# Patient Record
Sex: MALE | Race: WHITE | Employment: FULL TIME | URBAN - METROPOLITAN AREA
[De-identification: names, ages, dates, MRNs, and addresses within clinical notes are randomized per-mention and may not be internally consistent; named-entity substitution may affect disease eponyms.]

---

## 2017-01-05 RX ORDER — SERTRALINE HYDROCHLORIDE 100 MG/1
TABLET, FILM COATED ORAL
Qty: 30 TABLET | Refills: 0 | Status: SHIPPED | OUTPATIENT
Start: 2017-01-05 | End: 2017-02-05 | Stop reason: SDUPTHER

## 2017-01-26 ENCOUNTER — OFFICE VISIT (OUTPATIENT)
Dept: FAMILY MEDICINE CLINIC | Age: 42
End: 2017-01-26

## 2017-01-26 ENCOUNTER — TELEPHONE (OUTPATIENT)
Dept: FAMILY MEDICINE CLINIC | Age: 42
End: 2017-01-26

## 2017-01-26 VITALS
HEIGHT: 70 IN | HEART RATE: 116 BPM | DIASTOLIC BLOOD PRESSURE: 76 MMHG | BODY MASS INDEX: 35.65 KG/M2 | SYSTOLIC BLOOD PRESSURE: 132 MMHG | RESPIRATION RATE: 20 BRPM | WEIGHT: 249 LBS

## 2017-01-26 DIAGNOSIS — R53.81 MALAISE AND FATIGUE: ICD-10-CM

## 2017-01-26 DIAGNOSIS — R63.1 ALWAYS THIRSTY: ICD-10-CM

## 2017-01-26 DIAGNOSIS — R53.83 MALAISE AND FATIGUE: ICD-10-CM

## 2017-01-26 DIAGNOSIS — E66.01 MORBID OBESITY DUE TO EXCESS CALORIES (HCC): ICD-10-CM

## 2017-01-26 DIAGNOSIS — R35.0 URINE FREQUENCY: Primary | ICD-10-CM

## 2017-01-26 DIAGNOSIS — E11.9 TYPE 2 DIABETES MELLITUS WITHOUT COMPLICATION, WITHOUT LONG-TERM CURRENT USE OF INSULIN (HCC): ICD-10-CM

## 2017-01-26 DIAGNOSIS — Z13.6 SCREENING FOR CARDIOVASCULAR CONDITION: ICD-10-CM

## 2017-01-26 LAB
ALBUMIN SERPL-MCNC: 4.8 G/DL (ref 3.9–4.9)
ALP BLD-CCNC: 96 U/L (ref 35–104)
ALT SERPL-CCNC: 107 U/L (ref 0–41)
ANION GAP SERPL CALCULATED.3IONS-SCNC: 18 MEQ/L (ref 7–13)
ANISOCYTOSIS: 0
AST SERPL-CCNC: 94 U/L (ref 0–40)
BASOPHILS ABSOLUTE: 0 K/UL (ref 0–0.2)
BASOPHILS RELATIVE PERCENT: 0.5 %
BILIRUB SERPL-MCNC: 0.8 MG/DL (ref 0–1.2)
BILIRUBIN, POC: NORMAL
BLOOD URINE, POC: NORMAL
BUN BLDV-MCNC: 14 MG/DL (ref 6–20)
CALCIUM SERPL-MCNC: 9.3 MG/DL (ref 8.6–10.2)
CHLORIDE BLD-SCNC: 94 MEQ/L (ref 98–107)
CHOLESTEROL, TOTAL: 345 MG/DL (ref 0–199)
CLARITY, POC: CLEAR
CO2: 22 MEQ/L (ref 22–29)
COLOR, POC: NORMAL
CREAT SERPL-MCNC: 0.76 MG/DL (ref 0.7–1.2)
EOSINOPHILS ABSOLUTE: 0.1 K/UL (ref 0–0.7)
EOSINOPHILS RELATIVE PERCENT: 1.3 %
GFR AFRICAN AMERICAN: >60
GFR NON-AFRICAN AMERICAN: >60
GLOBULIN: 2.7 G/DL (ref 2.3–3.5)
GLUCOSE BLD-MCNC: 333 MG/DL
GLUCOSE BLD-MCNC: 346 MG/DL (ref 74–109)
GLUCOSE URINE, POC: 1000
HBA1C MFR BLD: 10.2 % (ref 4.8–5.9)
HCT VFR BLD CALC: 46.8 % (ref 42–52)
HDLC SERPL-MCNC: 33 MG/DL (ref 40–59)
HEMOGLOBIN: 16.6 G/DL (ref 14–18)
HYPOCHROMIA: 0
KETONES, POC: 40
LDL CHOLESTEROL CALCULATED: ABNORMAL MG/DL (ref 0–129)
LEUKOCYTE EST, POC: NORMAL
LYMPHOCYTES ABSOLUTE: 1.5 K/UL (ref 1–4.8)
LYMPHOCYTES RELATIVE PERCENT: 18.3 %
MACROCYTES: 0
MCH RBC QN AUTO: 31.7 PG (ref 27–31.3)
MCHC RBC AUTO-ENTMCNC: 35.5 % (ref 33–37)
MCV RBC AUTO: 89.2 FL (ref 80–100)
MICROCYTES: 0
MONOCYTES ABSOLUTE: 0.4 K/UL (ref 0.2–0.8)
MONOCYTES RELATIVE PERCENT: 4.8 %
NEUTROPHILS ABSOLUTE: 6.3 K/UL (ref 1.4–6.5)
NEUTROPHILS RELATIVE PERCENT: 75.1 %
NITRITE, POC: NORMAL
PDW BLD-RTO: 13.3 % (ref 11.5–14.5)
PH, POC: 5.5
PLATELET # BLD: 165 K/UL (ref 130–400)
POIKILOCYTES: 0
POLYCHROMASIA: 0
POTASSIUM SERPL-SCNC: 3.8 MEQ/L (ref 3.5–5.1)
PROTEIN, POC: 15
RBC # BLD: 5.24 M/UL (ref 4.7–6.1)
SODIUM BLD-SCNC: 134 MEQ/L (ref 132–144)
SPECIFIC GRAVITY, POC: 1.02
T4 FREE: 0.99 NG/DL (ref 0.93–1.7)
TOTAL PROTEIN: 7.5 G/DL (ref 6.4–8.1)
TRIGL SERPL-MCNC: 2278 MG/DL (ref 0–200)
UROBILINOGEN, POC: 0.2
WBC # BLD: 8.4 K/UL (ref 4.8–10.8)

## 2017-01-26 PROCEDURE — 1036F TOBACCO NON-USER: CPT | Performed by: FAMILY MEDICINE

## 2017-01-26 PROCEDURE — 82962 GLUCOSE BLOOD TEST: CPT | Performed by: FAMILY MEDICINE

## 2017-01-26 PROCEDURE — G8427 DOCREV CUR MEDS BY ELIG CLIN: HCPCS | Performed by: FAMILY MEDICINE

## 2017-01-26 PROCEDURE — 3046F HEMOGLOBIN A1C LEVEL >9.0%: CPT | Performed by: FAMILY MEDICINE

## 2017-01-26 PROCEDURE — 81003 URINALYSIS AUTO W/O SCOPE: CPT | Performed by: FAMILY MEDICINE

## 2017-01-26 PROCEDURE — G8419 CALC BMI OUT NRM PARAM NOF/U: HCPCS | Performed by: FAMILY MEDICINE

## 2017-01-26 PROCEDURE — 99214 OFFICE O/P EST MOD 30 MIN: CPT | Performed by: FAMILY MEDICINE

## 2017-01-26 PROCEDURE — G8484 FLU IMMUNIZE NO ADMIN: HCPCS | Performed by: FAMILY MEDICINE

## 2017-01-26 RX ORDER — LANCETS 30 GAUGE
EACH MISCELLANEOUS
Qty: 100 EACH | Refills: 3 | Status: SHIPPED | OUTPATIENT
Start: 2017-01-26

## 2017-01-26 RX ORDER — LORAZEPAM 0.5 MG/1
TABLET ORAL
Qty: 30 TABLET | Refills: 0 | Status: ON HOLD | OUTPATIENT
Start: 2017-01-26 | End: 2021-02-26

## 2017-01-26 ASSESSMENT — ENCOUNTER SYMPTOMS
EYE ITCHING: 0
VOMITING: 0
COUGH: 0
SHORTNESS OF BREATH: 0
CONSTIPATION: 0
ABDOMINAL PAIN: 0
NAUSEA: 0
EYE DISCHARGE: 0
DIARRHEA: 0
SORE THROAT: 0
SINUS PRESSURE: 0

## 2017-01-27 ENCOUNTER — TELEPHONE (OUTPATIENT)
Dept: FAMILY MEDICINE CLINIC | Age: 42
End: 2017-01-27

## 2017-01-27 RX ORDER — GLIMEPIRIDE 2 MG/1
2 TABLET ORAL
Qty: 30 TABLET | Refills: 3 | Status: SHIPPED | OUTPATIENT
Start: 2017-01-27 | End: 2017-01-30 | Stop reason: SDUPTHER

## 2017-01-28 LAB — TESTOSTERONE TOTAL-MALE: 299 NG/DL (ref 300–890)

## 2017-01-30 ENCOUNTER — OFFICE VISIT (OUTPATIENT)
Dept: FAMILY MEDICINE CLINIC | Age: 42
End: 2017-01-30

## 2017-01-30 ENCOUNTER — CARE COORDINATOR VISIT (OUTPATIENT)
Dept: CARE COORDINATION | Age: 42
End: 2017-01-30

## 2017-01-30 VITALS
SYSTOLIC BLOOD PRESSURE: 124 MMHG | HEART RATE: 84 BPM | WEIGHT: 244 LBS | BODY MASS INDEX: 34.93 KG/M2 | HEIGHT: 70 IN | DIASTOLIC BLOOD PRESSURE: 82 MMHG | RESPIRATION RATE: 16 BRPM | TEMPERATURE: 97.4 F

## 2017-01-30 DIAGNOSIS — E11.9 TYPE 2 DIABETES MELLITUS WITHOUT COMPLICATION, WITHOUT LONG-TERM CURRENT USE OF INSULIN (HCC): ICD-10-CM

## 2017-01-30 PROCEDURE — 99214 OFFICE O/P EST MOD 30 MIN: CPT | Performed by: FAMILY MEDICINE

## 2017-01-30 PROCEDURE — 1036F TOBACCO NON-USER: CPT | Performed by: FAMILY MEDICINE

## 2017-01-30 PROCEDURE — G8484 FLU IMMUNIZE NO ADMIN: HCPCS | Performed by: FAMILY MEDICINE

## 2017-01-30 PROCEDURE — G8419 CALC BMI OUT NRM PARAM NOF/U: HCPCS | Performed by: FAMILY MEDICINE

## 2017-01-30 PROCEDURE — G8427 DOCREV CUR MEDS BY ELIG CLIN: HCPCS | Performed by: FAMILY MEDICINE

## 2017-01-30 PROCEDURE — 3046F HEMOGLOBIN A1C LEVEL >9.0%: CPT | Performed by: FAMILY MEDICINE

## 2017-01-30 RX ORDER — GLIMEPIRIDE 2 MG/1
2 TABLET ORAL
Qty: 60 TABLET | Refills: 5 | Status: SHIPPED | OUTPATIENT
Start: 2017-01-30 | End: 2018-02-02 | Stop reason: SDUPTHER

## 2017-01-30 RX ORDER — OMEGA-3-ACID ETHYL ESTERS 1 G/1
2 CAPSULE, LIQUID FILLED ORAL 2 TIMES DAILY
Qty: 120 CAPSULE | Refills: 3 | Status: SHIPPED | OUTPATIENT
Start: 2017-01-30 | End: 2017-05-01 | Stop reason: SDUPTHER

## 2017-01-30 ASSESSMENT — ENCOUNTER SYMPTOMS
SORE THROAT: 0
SINUS PRESSURE: 0
EYE DISCHARGE: 0
CONSTIPATION: 0
DIARRHEA: 0
EYE ITCHING: 0
ABDOMINAL PAIN: 0

## 2017-02-06 RX ORDER — SERTRALINE HYDROCHLORIDE 100 MG/1
TABLET, FILM COATED ORAL
Qty: 30 TABLET | Refills: 5 | Status: SHIPPED | OUTPATIENT
Start: 2017-02-06 | End: 2017-07-10 | Stop reason: SDUPTHER

## 2017-02-08 ENCOUNTER — CARE COORDINATION (OUTPATIENT)
Dept: CARE COORDINATION | Age: 42
End: 2017-02-08

## 2017-03-09 ENCOUNTER — OFFICE VISIT (OUTPATIENT)
Dept: FAMILY MEDICINE CLINIC | Age: 42
End: 2017-03-09

## 2017-03-09 VITALS
WEIGHT: 254 LBS | DIASTOLIC BLOOD PRESSURE: 96 MMHG | SYSTOLIC BLOOD PRESSURE: 138 MMHG | RESPIRATION RATE: 16 BRPM | HEART RATE: 88 BPM | HEIGHT: 70 IN | BODY MASS INDEX: 36.36 KG/M2 | TEMPERATURE: 97.8 F

## 2017-03-09 DIAGNOSIS — Z23 NEED FOR PNEUMOCOCCAL VACCINATION: ICD-10-CM

## 2017-03-09 DIAGNOSIS — E66.01 MORBID OBESITY DUE TO EXCESS CALORIES (HCC): ICD-10-CM

## 2017-03-09 DIAGNOSIS — K21.9 GASTROESOPHAGEAL REFLUX DISEASE WITHOUT ESOPHAGITIS: ICD-10-CM

## 2017-03-09 DIAGNOSIS — E11.9 TYPE 2 DIABETES MELLITUS WITHOUT COMPLICATION, WITHOUT LONG-TERM CURRENT USE OF INSULIN (HCC): Primary | ICD-10-CM

## 2017-03-09 PROCEDURE — 99213 OFFICE O/P EST LOW 20 MIN: CPT | Performed by: FAMILY MEDICINE

## 2017-03-09 PROCEDURE — 3046F HEMOGLOBIN A1C LEVEL >9.0%: CPT | Performed by: FAMILY MEDICINE

## 2017-03-09 PROCEDURE — G8427 DOCREV CUR MEDS BY ELIG CLIN: HCPCS | Performed by: FAMILY MEDICINE

## 2017-03-09 PROCEDURE — G8484 FLU IMMUNIZE NO ADMIN: HCPCS | Performed by: FAMILY MEDICINE

## 2017-03-09 PROCEDURE — 1036F TOBACCO NON-USER: CPT | Performed by: FAMILY MEDICINE

## 2017-03-09 PROCEDURE — G8419 CALC BMI OUT NRM PARAM NOF/U: HCPCS | Performed by: FAMILY MEDICINE

## 2017-03-09 ASSESSMENT — ENCOUNTER SYMPTOMS
CONSTIPATION: 0
DIARRHEA: 0
ABDOMINAL PAIN: 0
EYE DISCHARGE: 0
SHORTNESS OF BREATH: 0
SORE THROAT: 0
SINUS PRESSURE: 0
COUGH: 0
EYE ITCHING: 0

## 2017-05-02 RX ORDER — OMEGA-3-ACID ETHYL ESTERS 1 G/1
CAPSULE, LIQUID FILLED ORAL
Qty: 120 CAPSULE | Refills: 4 | Status: SHIPPED | OUTPATIENT
Start: 2017-05-02 | End: 2018-06-15 | Stop reason: SDUPTHER

## 2017-05-02 RX ORDER — DICYCLOMINE HCL 20 MG
20 TABLET ORAL EVERY 6 HOURS
Qty: 120 TABLET | Refills: 4 | Status: SHIPPED | OUTPATIENT
Start: 2017-05-02

## 2017-07-10 RX ORDER — SERTRALINE HYDROCHLORIDE 100 MG/1
100 TABLET, FILM COATED ORAL DAILY
Qty: 90 TABLET | Refills: 0 | Status: SHIPPED | OUTPATIENT
Start: 2017-07-10 | End: 2017-07-27 | Stop reason: SDUPTHER

## 2017-07-10 RX ORDER — SERTRALINE HYDROCHLORIDE 100 MG/1
TABLET, FILM COATED ORAL
Qty: 30 TABLET | Refills: 5 | Status: CANCELLED | OUTPATIENT
Start: 2017-07-10

## 2017-07-27 RX ORDER — SERTRALINE HYDROCHLORIDE 100 MG/1
100 TABLET, FILM COATED ORAL DAILY
Qty: 90 TABLET | Refills: 0 | Status: SHIPPED | OUTPATIENT
Start: 2017-07-27 | End: 2017-11-06 | Stop reason: SDUPTHER

## 2017-11-06 RX ORDER — SERTRALINE HYDROCHLORIDE 100 MG/1
TABLET, FILM COATED ORAL
Qty: 90 TABLET | Refills: 0 | Status: SHIPPED | OUTPATIENT
Start: 2017-11-06 | End: 2018-02-16 | Stop reason: SDUPTHER

## 2018-02-05 RX ORDER — GLIMEPIRIDE 2 MG/1
TABLET ORAL
Qty: 60 TABLET | Refills: 0 | Status: SHIPPED | OUTPATIENT
Start: 2018-02-05 | End: 2018-02-20 | Stop reason: SDUPTHER

## 2018-02-16 ENCOUNTER — OFFICE VISIT (OUTPATIENT)
Dept: FAMILY MEDICINE CLINIC | Age: 43
End: 2018-02-16
Payer: COMMERCIAL

## 2018-02-16 VITALS
DIASTOLIC BLOOD PRESSURE: 86 MMHG | SYSTOLIC BLOOD PRESSURE: 138 MMHG | HEART RATE: 87 BPM | TEMPERATURE: 97.9 F | BODY MASS INDEX: 38.65 KG/M2 | WEIGHT: 270 LBS | RESPIRATION RATE: 16 BRPM | HEIGHT: 70 IN

## 2018-02-16 DIAGNOSIS — E78.49 OTHER HYPERLIPIDEMIA: ICD-10-CM

## 2018-02-16 DIAGNOSIS — E11.9 TYPE 2 DIABETES MELLITUS WITHOUT COMPLICATION, WITHOUT LONG-TERM CURRENT USE OF INSULIN (HCC): Primary | ICD-10-CM

## 2018-02-16 DIAGNOSIS — Z12.11 ENCOUNTER FOR SCREENING COLONOSCOPY: ICD-10-CM

## 2018-02-16 DIAGNOSIS — E11.9 TYPE 2 DIABETES MELLITUS WITHOUT COMPLICATION, WITHOUT LONG-TERM CURRENT USE OF INSULIN (HCC): ICD-10-CM

## 2018-02-16 LAB
ALBUMIN SERPL-MCNC: 4.7 G/DL (ref 3.9–4.9)
ALP BLD-CCNC: 90 U/L (ref 35–104)
ALT SERPL-CCNC: 138 U/L (ref 0–41)
ANION GAP SERPL CALCULATED.3IONS-SCNC: 19 MEQ/L (ref 7–13)
AST SERPL-CCNC: 54 U/L (ref 0–40)
BASOPHILS ABSOLUTE: 0.1 K/UL (ref 0–0.2)
BASOPHILS RELATIVE PERCENT: 0.8 %
BILIRUB SERPL-MCNC: 0.4 MG/DL (ref 0–1.2)
BUN BLDV-MCNC: 8 MG/DL (ref 6–20)
CALCIUM SERPL-MCNC: 9.5 MG/DL (ref 8.6–10.2)
CHLORIDE BLD-SCNC: 100 MEQ/L (ref 98–107)
CHOLESTEROL, TOTAL: 249 MG/DL (ref 0–199)
CO2: 22 MEQ/L (ref 22–29)
CREAT SERPL-MCNC: 0.68 MG/DL (ref 0.7–1.2)
CREATININE URINE: 152.5 MG/DL
EOSINOPHILS ABSOLUTE: 0.1 K/UL (ref 0–0.7)
EOSINOPHILS RELATIVE PERCENT: 1.8 %
GFR AFRICAN AMERICAN: >60
GFR NON-AFRICAN AMERICAN: >60
GLOBULIN: 3 G/DL (ref 2.3–3.5)
GLUCOSE BLD-MCNC: 188 MG/DL (ref 74–109)
HBA1C MFR BLD: 7.1 % (ref 4.8–5.9)
HCT VFR BLD CALC: 45.2 % (ref 42–52)
HDLC SERPL-MCNC: 39 MG/DL (ref 40–59)
HEMOGLOBIN: 15.8 G/DL (ref 14–18)
LDL CHOLESTEROL CALCULATED: 143 MG/DL (ref 0–129)
LYMPHOCYTES ABSOLUTE: 1.8 K/UL (ref 1–4.8)
LYMPHOCYTES RELATIVE PERCENT: 23 %
MCH RBC QN AUTO: 32.1 PG (ref 27–31.3)
MCHC RBC AUTO-ENTMCNC: 35 % (ref 33–37)
MCV RBC AUTO: 91.7 FL (ref 80–100)
MICROALBUMIN UR-MCNC: 3 MG/DL
MICROALBUMIN/CREAT UR-RTO: 19.7 MG/G (ref 0–30)
MONOCYTES ABSOLUTE: 0.6 K/UL (ref 0.2–0.8)
MONOCYTES RELATIVE PERCENT: 7.9 %
NEUTROPHILS ABSOLUTE: 5.1 K/UL (ref 1.4–6.5)
NEUTROPHILS RELATIVE PERCENT: 66.5 %
PDW BLD-RTO: 13.6 % (ref 11.5–14.5)
PLATELET # BLD: 195 K/UL (ref 130–400)
POTASSIUM SERPL-SCNC: 4.2 MEQ/L (ref 3.5–5.1)
RBC # BLD: 4.93 M/UL (ref 4.7–6.1)
SODIUM BLD-SCNC: 141 MEQ/L (ref 132–144)
TOTAL PROTEIN: 7.7 G/DL (ref 6.4–8.1)
TRIGL SERPL-MCNC: 334 MG/DL (ref 0–200)
WBC # BLD: 7.6 K/UL (ref 4.8–10.8)

## 2018-02-16 PROCEDURE — G8484 FLU IMMUNIZE NO ADMIN: HCPCS | Performed by: FAMILY MEDICINE

## 2018-02-16 PROCEDURE — 99214 OFFICE O/P EST MOD 30 MIN: CPT | Performed by: FAMILY MEDICINE

## 2018-02-16 PROCEDURE — 3046F HEMOGLOBIN A1C LEVEL >9.0%: CPT | Performed by: FAMILY MEDICINE

## 2018-02-16 PROCEDURE — G8417 CALC BMI ABV UP PARAM F/U: HCPCS | Performed by: FAMILY MEDICINE

## 2018-02-16 PROCEDURE — 1036F TOBACCO NON-USER: CPT | Performed by: FAMILY MEDICINE

## 2018-02-16 PROCEDURE — G8427 DOCREV CUR MEDS BY ELIG CLIN: HCPCS | Performed by: FAMILY MEDICINE

## 2018-02-16 RX ORDER — SERTRALINE HYDROCHLORIDE 100 MG/1
TABLET, FILM COATED ORAL
Qty: 90 TABLET | Refills: 1 | Status: SHIPPED | OUTPATIENT
Start: 2018-02-16 | End: 2018-07-24 | Stop reason: SDUPTHER

## 2018-02-16 ASSESSMENT — ENCOUNTER SYMPTOMS
SORE THROAT: 0
ABDOMINAL PAIN: 0
CONSTIPATION: 0
DIARRHEA: 0
EYE ITCHING: 0
COUGH: 0
SHORTNESS OF BREATH: 0
EYE DISCHARGE: 0
SINUS PRESSURE: 0

## 2018-02-16 ASSESSMENT — PATIENT HEALTH QUESTIONNAIRE - PHQ9
SUM OF ALL RESPONSES TO PHQ QUESTIONS 1-9: 0
1. LITTLE INTEREST OR PLEASURE IN DOING THINGS: 0
2. FEELING DOWN, DEPRESSED OR HOPELESS: 0
SUM OF ALL RESPONSES TO PHQ9 QUESTIONS 1 & 2: 0

## 2018-02-16 NOTE — PROGRESS NOTES
Gabriella  Rod Has, 38 y.o. male presents today with:  Chief Complaint   Patient presents with    Diabetes           HPI    Patient in for follow-up of her diabetes not been seen in over a year. Home blood sugar records:  fasting range: 120  Eye exam current (within one year): no Diabetic eye exam was recommended  Tobacco history: He  reports that he has quit smoking. He smoked 0.50 packs per day. He has never used smokeless tobacco.   Daily Aspirin? No:              Lab Results   Component Value Date    LABA1C 10.2 (H) 01/26/2017     Lab Results   Component Value Date    CREATININE 0.76 01/26/2017     Lab Results   Component Value Date     (H) 01/26/2017    AST 94 (H) 01/26/2017     Lab Results   Component Value Date    CHOL 345 (H) 01/26/2017    TRIG 2,278 (H) 01/26/2017    HDL 33 (L) 01/26/2017    LDLCALC see below 01/26/2017            No other questions and or concerns for today's visit      Review of Systems   Constitutional: Negative for appetite change, fatigue and fever. HENT: Negative for congestion, ear pain, sinus pressure and sore throat. Eyes: Negative for discharge and itching. Respiratory: Negative for cough and shortness of breath. Cardiovascular: Negative for chest pain and palpitations. Gastrointestinal: Negative for abdominal pain, constipation and diarrhea. Endocrine: Negative for polydipsia. Genitourinary: Negative for difficulty urinating. Musculoskeletal: Negative for gait problem. Skin: Negative. Neurological: Negative for dizziness. Hematological: Negative. Psychiatric/Behavioral: Negative.           Past Medical History:   Diagnosis Date    Alcohol abuse     Diverticulitis     GERD (gastroesophageal reflux disease)     Obesity 7/9/2015    Other hyperlipidemia 2/16/2018    Type 2 diabetes mellitus without complication, without long-term current use of insulin ([facility] [address] 1/30/2017     Past Surgical History:   Procedure Laterality Date

## 2018-02-20 RX ORDER — GLIMEPIRIDE 2 MG/1
TABLET ORAL
Qty: 180 TABLET | Refills: 4 | Status: SHIPPED | OUTPATIENT
Start: 2018-02-20

## 2018-06-15 DIAGNOSIS — E78.49 OTHER HYPERLIPIDEMIA: Primary | ICD-10-CM

## 2018-06-15 RX ORDER — OMEGA-3-ACID ETHYL ESTERS 1 G/1
CAPSULE, LIQUID FILLED ORAL
Qty: 120 CAPSULE | Refills: 4 | Status: SHIPPED | OUTPATIENT
Start: 2018-06-15

## 2018-07-24 RX ORDER — SERTRALINE HYDROCHLORIDE 100 MG/1
TABLET, FILM COATED ORAL
Qty: 90 TABLET | Refills: 0 | Status: SHIPPED | OUTPATIENT
Start: 2018-07-24 | End: 2018-11-15 | Stop reason: SDUPTHER

## 2018-11-16 RX ORDER — SERTRALINE HYDROCHLORIDE 100 MG/1
TABLET, FILM COATED ORAL
Qty: 90 TABLET | Refills: 0 | Status: SHIPPED | OUTPATIENT
Start: 2018-11-16 | End: 2019-01-28 | Stop reason: SDUPTHER

## 2019-01-28 RX ORDER — SERTRALINE HYDROCHLORIDE 100 MG/1
TABLET, FILM COATED ORAL
Qty: 30 TABLET | Refills: 0 | Status: SHIPPED | OUTPATIENT
Start: 2019-01-28

## 2019-01-28 RX ORDER — SERTRALINE HYDROCHLORIDE 100 MG/1
TABLET, FILM COATED ORAL
Qty: 90 TABLET | Refills: 0 | Status: SHIPPED | OUTPATIENT
Start: 2019-01-28 | End: 2019-01-28 | Stop reason: SDUPTHER

## 2019-11-07 ENCOUNTER — HOSPITAL ENCOUNTER (EMERGENCY)
Age: 44
Discharge: HOME | End: 2019-11-07
Attending: INTERNAL MEDICINE
Payer: COMMERCIAL

## 2019-11-07 ENCOUNTER — APPOINTMENT (OUTPATIENT)
Dept: GENERAL RADIOLOGY | Age: 44
End: 2019-11-07
Payer: COMMERCIAL

## 2019-11-07 VITALS
TEMPERATURE: 98 F | OXYGEN SATURATION: 96 % | DIASTOLIC BLOOD PRESSURE: 90 MMHG | RESPIRATION RATE: 18 BRPM | SYSTOLIC BLOOD PRESSURE: 138 MMHG | BODY MASS INDEX: 35.79 KG/M2 | HEIGHT: 70 IN | WEIGHT: 250 LBS | HEART RATE: 77 BPM

## 2019-11-07 DIAGNOSIS — R07.89 CHEST WALL PAIN: Primary | ICD-10-CM

## 2019-11-07 DIAGNOSIS — M79.89 BILATERAL HAND SWELLING: ICD-10-CM

## 2019-11-07 DIAGNOSIS — I10 ESSENTIAL HYPERTENSION: ICD-10-CM

## 2019-11-07 LAB
ALBUMIN SERPL-MCNC: 4.5 G/DL (ref 3.5–4.6)
ALP BLD-CCNC: 73 U/L (ref 35–104)
ALT SERPL-CCNC: 50 U/L (ref 0–41)
ANION GAP SERPL CALCULATED.3IONS-SCNC: 13 MEQ/L (ref 9–15)
APTT: 29 SEC (ref 24.4–36.8)
AST SERPL-CCNC: 25 U/L (ref 0–40)
BASOPHILS ABSOLUTE: 0 K/UL (ref 0–0.2)
BASOPHILS RELATIVE PERCENT: 0.3 %
BILIRUB SERPL-MCNC: 0.6 MG/DL (ref 0.2–0.7)
BUN BLDV-MCNC: 10 MG/DL (ref 6–20)
CALCIUM SERPL-MCNC: 9.5 MG/DL (ref 8.5–9.9)
CHLORIDE BLD-SCNC: 103 MEQ/L (ref 95–107)
CO2: 23 MEQ/L (ref 20–31)
CREAT SERPL-MCNC: 0.71 MG/DL (ref 0.7–1.2)
EKG ATRIAL RATE: 79 BPM
EKG P AXIS: 66 DEGREES
EKG P-R INTERVAL: 172 MS
EKG Q-T INTERVAL: 408 MS
EKG QRS DURATION: 156 MS
EKG QTC CALCULATION (BAZETT): 467 MS
EKG R AXIS: 83 DEGREES
EKG T AXIS: 25 DEGREES
EKG VENTRICULAR RATE: 79 BPM
EOSINOPHILS ABSOLUTE: 0.1 K/UL (ref 0–0.7)
EOSINOPHILS RELATIVE PERCENT: 1.5 %
GFR AFRICAN AMERICAN: >60
GFR NON-AFRICAN AMERICAN: >60
GLOBULIN: 3.3 G/DL (ref 2.3–3.5)
GLUCOSE BLD-MCNC: 103 MG/DL (ref 70–99)
HCT VFR BLD CALC: 43.6 % (ref 42–52)
HEMOGLOBIN: 15.1 G/DL (ref 14–18)
INR BLD: 1
LYMPHOCYTES ABSOLUTE: 1.6 K/UL (ref 1–4.8)
LYMPHOCYTES RELATIVE PERCENT: 19.4 %
MCH RBC QN AUTO: 32.2 PG (ref 27–31.3)
MCHC RBC AUTO-ENTMCNC: 34.7 % (ref 33–37)
MCV RBC AUTO: 93 FL (ref 80–100)
MONOCYTES ABSOLUTE: 0.4 K/UL (ref 0.2–0.8)
MONOCYTES RELATIVE PERCENT: 5.2 %
NEUTROPHILS ABSOLUTE: 6.2 K/UL (ref 1.4–6.5)
NEUTROPHILS RELATIVE PERCENT: 73.6 %
PDW BLD-RTO: 13.1 % (ref 11.5–14.5)
PLATELET # BLD: 202 K/UL (ref 130–400)
POTASSIUM SERPL-SCNC: 4.2 MEQ/L (ref 3.4–4.9)
PROTHROMBIN TIME: 13 SEC (ref 12.3–14.9)
RBC # BLD: 4.69 M/UL (ref 4.7–6.1)
SODIUM BLD-SCNC: 139 MEQ/L (ref 135–144)
TOTAL CK: 55 U/L (ref 0–190)
TOTAL PROTEIN: 7.8 G/DL (ref 6.3–8)
TROPONIN: <0.01 NG/ML (ref 0–0.01)
TROPONIN: <0.01 NG/ML (ref 0–0.01)
WBC # BLD: 8.4 K/UL (ref 4.8–10.8)

## 2019-11-07 PROCEDURE — 84484 ASSAY OF TROPONIN QUANT: CPT

## 2019-11-07 PROCEDURE — 85025 COMPLETE CBC W/AUTO DIFF WBC: CPT

## 2019-11-07 PROCEDURE — 85610 PROTHROMBIN TIME: CPT

## 2019-11-07 PROCEDURE — 96374 THER/PROPH/DIAG INJ IV PUSH: CPT

## 2019-11-07 PROCEDURE — 6360000002 HC RX W HCPCS: Performed by: INTERNAL MEDICINE

## 2019-11-07 PROCEDURE — 82550 ASSAY OF CK (CPK): CPT

## 2019-11-07 PROCEDURE — 2580000003 HC RX 258: Performed by: INTERNAL MEDICINE

## 2019-11-07 PROCEDURE — 96375 TX/PRO/DX INJ NEW DRUG ADDON: CPT

## 2019-11-07 PROCEDURE — 99285 EMERGENCY DEPT VISIT HI MDM: CPT

## 2019-11-07 PROCEDURE — 80053 COMPREHEN METABOLIC PANEL: CPT

## 2019-11-07 PROCEDURE — 71045 X-RAY EXAM CHEST 1 VIEW: CPT

## 2019-11-07 PROCEDURE — 93010 ELECTROCARDIOGRAM REPORT: CPT | Performed by: INTERNAL MEDICINE

## 2019-11-07 PROCEDURE — 93005 ELECTROCARDIOGRAM TRACING: CPT

## 2019-11-07 PROCEDURE — 36415 COLL VENOUS BLD VENIPUNCTURE: CPT

## 2019-11-07 PROCEDURE — 6370000000 HC RX 637 (ALT 250 FOR IP): Performed by: INTERNAL MEDICINE

## 2019-11-07 PROCEDURE — 85730 THROMBOPLASTIN TIME PARTIAL: CPT

## 2019-11-07 RX ORDER — TRAMADOL HYDROCHLORIDE 50 MG/1
50 TABLET ORAL EVERY 12 HOURS PRN
Qty: 6 TABLET | Refills: 0 | Status: SHIPPED | OUTPATIENT
Start: 2019-11-07 | End: 2019-11-10

## 2019-11-07 RX ORDER — MORPHINE SULFATE 4 MG/ML
4 INJECTION, SOLUTION INTRAMUSCULAR; INTRAVENOUS ONCE
Status: COMPLETED | OUTPATIENT
Start: 2019-11-07 | End: 2019-11-07

## 2019-11-07 RX ORDER — ORPHENADRINE CITRATE 100 MG/1
100 TABLET, EXTENDED RELEASE ORAL 2 TIMES DAILY
Qty: 20 TABLET | Refills: 0 | Status: SHIPPED | OUTPATIENT
Start: 2019-11-07 | End: 2019-11-17

## 2019-11-07 RX ORDER — ONDANSETRON 2 MG/ML
4 INJECTION INTRAMUSCULAR; INTRAVENOUS ONCE
Status: COMPLETED | OUTPATIENT
Start: 2019-11-07 | End: 2019-11-07

## 2019-11-07 RX ORDER — ASPIRIN 81 MG/1
324 TABLET, CHEWABLE ORAL ONCE
Status: COMPLETED | OUTPATIENT
Start: 2019-11-07 | End: 2019-11-07

## 2019-11-07 RX ORDER — METHYLPREDNISOLONE SODIUM SUCCINATE 125 MG/2ML
125 INJECTION, POWDER, LYOPHILIZED, FOR SOLUTION INTRAMUSCULAR; INTRAVENOUS ONCE
Status: DISCONTINUED | OUTPATIENT
Start: 2019-11-07 | End: 2019-11-07 | Stop reason: HOSPADM

## 2019-11-07 RX ORDER — DIPHENHYDRAMINE HYDROCHLORIDE 50 MG/ML
25 INJECTION INTRAMUSCULAR; INTRAVENOUS ONCE
Status: COMPLETED | OUTPATIENT
Start: 2019-11-07 | End: 2019-11-07

## 2019-11-07 RX ORDER — 0.9 % SODIUM CHLORIDE 0.9 %
1000 INTRAVENOUS SOLUTION INTRAVENOUS ONCE
Status: COMPLETED | OUTPATIENT
Start: 2019-11-07 | End: 2019-11-07

## 2019-11-07 RX ORDER — ORPHENADRINE CITRATE 30 MG/ML
60 INJECTION INTRAMUSCULAR; INTRAVENOUS ONCE
Status: COMPLETED | OUTPATIENT
Start: 2019-11-07 | End: 2019-11-07

## 2019-11-07 RX ADMIN — DIPHENHYDRAMINE HYDROCHLORIDE 25 MG: 50 INJECTION, SOLUTION INTRAMUSCULAR; INTRAVENOUS at 17:29

## 2019-11-07 RX ADMIN — ONDANSETRON 4 MG: 2 INJECTION INTRAMUSCULAR; INTRAVENOUS at 13:39

## 2019-11-07 RX ADMIN — ORPHENADRINE CITRATE 60 MG: 30 INJECTION INTRAMUSCULAR; INTRAVENOUS at 14:45

## 2019-11-07 RX ADMIN — NITROGLYCERIN 0.5 INCH: 20 OINTMENT TOPICAL at 13:39

## 2019-11-07 RX ADMIN — SODIUM CHLORIDE 1000 ML: 9 INJECTION, SOLUTION INTRAVENOUS at 13:28

## 2019-11-07 RX ADMIN — MORPHINE SULFATE 4 MG: 4 INJECTION, SOLUTION INTRAMUSCULAR; INTRAVENOUS at 13:38

## 2019-11-07 RX ADMIN — ASPIRIN 81 MG 162 MG: 81 TABLET ORAL at 13:28

## 2019-11-07 ASSESSMENT — PAIN DESCRIPTION - PAIN TYPE
TYPE: ACUTE PAIN

## 2019-11-07 ASSESSMENT — PAIN DESCRIPTION - FREQUENCY
FREQUENCY: CONTINUOUS
FREQUENCY: INTERMITTENT

## 2019-11-07 ASSESSMENT — PAIN DESCRIPTION - LOCATION
LOCATION: SHOULDER;CHEST
LOCATION: CHEST
LOCATION: CHEST

## 2019-11-07 ASSESSMENT — PAIN SCALES - GENERAL
PAINLEVEL_OUTOF10: 2
PAINLEVEL_OUTOF10: 4
PAINLEVEL_OUTOF10: 6
PAINLEVEL_OUTOF10: 6

## 2019-11-07 ASSESSMENT — PAIN DESCRIPTION - ORIENTATION
ORIENTATION: LEFT
ORIENTATION: LEFT

## 2019-11-07 ASSESSMENT — PAIN DESCRIPTION - DESCRIPTORS
DESCRIPTORS: ACHING
DESCRIPTORS: ACHING

## 2019-11-07 ASSESSMENT — PAIN DESCRIPTION - PROGRESSION: CLINICAL_PROGRESSION: GRADUALLY IMPROVING

## 2019-11-07 ASSESSMENT — HEART SCORE: ECG: 0

## 2019-11-09 ASSESSMENT — HEART SCORE: ECG: 0

## 2021-02-26 ENCOUNTER — APPOINTMENT (OUTPATIENT)
Dept: GENERAL RADIOLOGY | Age: 46
End: 2021-02-26
Payer: COMMERCIAL

## 2021-02-26 ENCOUNTER — HOSPITAL ENCOUNTER (INPATIENT)
Age: 46
LOS: 1 days | Discharge: HOME | DRG: 247 | End: 2021-02-27
Attending: FAMILY MEDICINE | Admitting: FAMILY MEDICINE
Payer: COMMERCIAL

## 2021-02-26 ENCOUNTER — APPOINTMENT (OUTPATIENT)
Dept: CARDIAC CATH/INVASIVE PROCEDURES | Age: 46
DRG: 247 | End: 2021-02-26
Attending: FAMILY MEDICINE
Payer: COMMERCIAL

## 2021-02-26 ENCOUNTER — HOSPITAL ENCOUNTER (EMERGENCY)
Age: 46
Discharge: ANOTHER HOSPITAL | End: 2021-02-26
Attending: EMERGENCY MEDICINE
Payer: COMMERCIAL

## 2021-02-26 VITALS
HEIGHT: 70 IN | OXYGEN SATURATION: 98 % | BODY MASS INDEX: 35.79 KG/M2 | DIASTOLIC BLOOD PRESSURE: 103 MMHG | TEMPERATURE: 98.3 F | RESPIRATION RATE: 18 BRPM | SYSTOLIC BLOOD PRESSURE: 151 MMHG | HEART RATE: 93 BPM | WEIGHT: 250 LBS

## 2021-02-26 DIAGNOSIS — I21.4 NSTEMI (NON-ST ELEVATED MYOCARDIAL INFARCTION) (HCC): Primary | ICD-10-CM

## 2021-02-26 LAB
ALBUMIN SERPL-MCNC: 4.5 G/DL (ref 3.5–4.6)
ALP BLD-CCNC: 90 U/L (ref 35–104)
ALT SERPL-CCNC: 55 U/L (ref 0–41)
ANION GAP SERPL CALCULATED.3IONS-SCNC: 15 MEQ/L (ref 9–15)
AST SERPL-CCNC: 40 U/L (ref 0–40)
BASOPHILS ABSOLUTE: 0 K/UL (ref 0–0.2)
BASOPHILS RELATIVE PERCENT: 0.3 %
BILIRUB SERPL-MCNC: 0.7 MG/DL (ref 0.2–0.7)
BUN BLDV-MCNC: 15 MG/DL (ref 6–20)
CALCIUM SERPL-MCNC: 9.6 MG/DL (ref 8.5–9.9)
CHLORIDE BLD-SCNC: 97 MEQ/L (ref 95–107)
CO2: 24 MEQ/L (ref 20–31)
CREAT SERPL-MCNC: 0.78 MG/DL (ref 0.7–1.2)
EKG ATRIAL RATE: 89 BPM
EKG ATRIAL RATE: 95 BPM
EKG P AXIS: 55 DEGREES
EKG P AXIS: 66 DEGREES
EKG P-R INTERVAL: 182 MS
EKG P-R INTERVAL: 184 MS
EKG Q-T INTERVAL: 382 MS
EKG Q-T INTERVAL: 404 MS
EKG QRS DURATION: 152 MS
EKG QRS DURATION: 156 MS
EKG QTC CALCULATION (BAZETT): 480 MS
EKG QTC CALCULATION (BAZETT): 491 MS
EKG R AXIS: 66 DEGREES
EKG R AXIS: 68 DEGREES
EKG T AXIS: 15 DEGREES
EKG T AXIS: 19 DEGREES
EKG VENTRICULAR RATE: 89 BPM
EKG VENTRICULAR RATE: 95 BPM
EOSINOPHILS ABSOLUTE: 0.2 K/UL (ref 0–0.7)
EOSINOPHILS RELATIVE PERCENT: 2.4 %
ETHANOL PERCENT: NORMAL G/DL
ETHANOL: <10 MG/DL (ref 0–0.08)
GFR AFRICAN AMERICAN: >60
GFR NON-AFRICAN AMERICAN: >60
GLOBULIN: 2.8 G/DL (ref 2.3–3.5)
GLUCOSE BLD-MCNC: 236 MG/DL (ref 70–99)
GLUCOSE BLD-MCNC: 239 MG/DL (ref 60–115)
GLUCOSE BLD-MCNC: 245 MG/DL (ref 60–115)
GLUCOSE BLD-MCNC: 354 MG/DL (ref 60–115)
HBA1C MFR BLD: 9.3 % (ref 4.8–5.9)
HCT VFR BLD CALC: 43.4 % (ref 42–52)
HEMOGLOBIN: 15.1 G/DL (ref 14–18)
INR BLD: 0.9
LIPASE: 44 U/L (ref 12–95)
LV EF: 60 %
LV EF: 65 %
LVEF MODALITY: NORMAL
LVEF MODALITY: NORMAL
LYMPHOCYTES ABSOLUTE: 1.7 K/UL (ref 1–4.8)
LYMPHOCYTES RELATIVE PERCENT: 19.8 %
MCH RBC QN AUTO: 31.7 PG (ref 27–31.3)
MCHC RBC AUTO-ENTMCNC: 34.8 % (ref 33–37)
MCV RBC AUTO: 91.2 FL (ref 80–100)
MONOCYTES ABSOLUTE: 0.5 K/UL (ref 0.2–0.8)
MONOCYTES RELATIVE PERCENT: 5.6 %
NEUTROPHILS ABSOLUTE: 6 K/UL (ref 1.4–6.5)
NEUTROPHILS RELATIVE PERCENT: 71.9 %
PDW BLD-RTO: 13.4 % (ref 11.5–14.5)
PERFORMED ON: ABNORMAL
PLATELET # BLD: 152 K/UL (ref 130–400)
POC ACTIVATED CLOTTING TIME KAOLIN: 224 SEC (ref 82–152)
POC ACTIVATED CLOTTING TIME KAOLIN: 257 SEC (ref 82–152)
POC SAMPLE TYPE: ABNORMAL
POC SAMPLE TYPE: ABNORMAL
POTASSIUM SERPL-SCNC: 4.4 MEQ/L (ref 3.4–4.9)
PROTHROMBIN TIME: 12.7 SEC (ref 12.3–14.9)
RBC # BLD: 4.76 M/UL (ref 4.7–6.1)
SARS-COV-2, NAAT: NOT DETECTED
SEDIMENTATION RATE, ERYTHROCYTE: 10 MM (ref 0–10)
SODIUM BLD-SCNC: 136 MEQ/L (ref 135–144)
TOTAL PROTEIN: 7.3 G/DL (ref 6.3–8)
TROPONIN: 0.33 NG/ML (ref 0–0.01)
TROPONIN: 0.34 NG/ML (ref 0–0.01)
TROPONIN: 0.37 NG/ML (ref 0–0.01)
WBC # BLD: 8.3 K/UL (ref 4.8–10.8)

## 2021-02-26 PROCEDURE — 6360000004 HC RX CONTRAST MEDICATION: Performed by: INTERNAL MEDICINE

## 2021-02-26 PROCEDURE — C1725 CATH, TRANSLUMIN NON-LASER: HCPCS

## 2021-02-26 PROCEDURE — 83036 HEMOGLOBIN GLYCOSYLATED A1C: CPT

## 2021-02-26 PROCEDURE — 71045 X-RAY EXAM CHEST 1 VIEW: CPT

## 2021-02-26 PROCEDURE — 93458 L HRT ARTERY/VENTRICLE ANGIO: CPT | Performed by: INTERNAL MEDICINE

## 2021-02-26 PROCEDURE — 96376 TX/PRO/DX INJ SAME DRUG ADON: CPT

## 2021-02-26 PROCEDURE — C1757 CATH, THROMBECTOMY/EMBOLECT: HCPCS

## 2021-02-26 PROCEDURE — 92928 PRQ TCAT PLMT NTRAC ST 1 LES: CPT | Performed by: INTERNAL MEDICINE

## 2021-02-26 PROCEDURE — 93306 TTE W/DOPPLER COMPLETE: CPT

## 2021-02-26 PROCEDURE — C1874 STENT, COATED/COV W/DEL SYS: HCPCS

## 2021-02-26 PROCEDURE — 2580000003 HC RX 258: Performed by: PHYSICIAN ASSISTANT

## 2021-02-26 PROCEDURE — 85652 RBC SED RATE AUTOMATED: CPT

## 2021-02-26 PROCEDURE — 6370000000 HC RX 637 (ALT 250 FOR IP)

## 2021-02-26 PROCEDURE — 36415 COLL VENOUS BLD VENIPUNCTURE: CPT

## 2021-02-26 PROCEDURE — B2111ZZ FLUOROSCOPY OF MULTIPLE CORONARY ARTERIES USING LOW OSMOLAR CONTRAST: ICD-10-PCS | Performed by: INTERNAL MEDICINE

## 2021-02-26 PROCEDURE — C1769 GUIDE WIRE: HCPCS

## 2021-02-26 PROCEDURE — 6370000000 HC RX 637 (ALT 250 FOR IP): Performed by: EMERGENCY MEDICINE

## 2021-02-26 PROCEDURE — 6360000002 HC RX W HCPCS

## 2021-02-26 PROCEDURE — C1894 INTRO/SHEATH, NON-LASER: HCPCS

## 2021-02-26 PROCEDURE — 6360000002 HC RX W HCPCS: Performed by: EMERGENCY MEDICINE

## 2021-02-26 PROCEDURE — 93005 ELECTROCARDIOGRAM TRACING: CPT

## 2021-02-26 PROCEDURE — 2060000000 HC ICU INTERMEDIATE R&B

## 2021-02-26 PROCEDURE — 80053 COMPREHEN METABOLIC PANEL: CPT

## 2021-02-26 PROCEDURE — 99291 CRITICAL CARE FIRST HOUR: CPT

## 2021-02-26 PROCEDURE — 96375 TX/PRO/DX INJ NEW DRUG ADDON: CPT

## 2021-02-26 PROCEDURE — C1887 CATHETER, GUIDING: HCPCS

## 2021-02-26 PROCEDURE — 2500000003 HC RX 250 WO HCPCS

## 2021-02-26 PROCEDURE — 83690 ASSAY OF LIPASE: CPT

## 2021-02-26 PROCEDURE — 85025 COMPLETE CBC W/AUTO DIFF WBC: CPT

## 2021-02-26 PROCEDURE — 99255 IP/OBS CONSLTJ NEW/EST HI 80: CPT | Performed by: INTERNAL MEDICINE

## 2021-02-26 PROCEDURE — 85347 COAGULATION TIME ACTIVATED: CPT

## 2021-02-26 PROCEDURE — 2709999900 HC NON-CHARGEABLE SUPPLY

## 2021-02-26 PROCEDURE — 2580000003 HC RX 258: Performed by: FAMILY MEDICINE

## 2021-02-26 PROCEDURE — 85610 PROTHROMBIN TIME: CPT

## 2021-02-26 PROCEDURE — 2580000003 HC RX 258: Performed by: EMERGENCY MEDICINE

## 2021-02-26 PROCEDURE — B2151ZZ FLUOROSCOPY OF LEFT HEART USING LOW OSMOLAR CONTRAST: ICD-10-PCS | Performed by: INTERNAL MEDICINE

## 2021-02-26 PROCEDURE — 027034Z DILATION OF CORONARY ARTERY, ONE ARTERY WITH DRUG-ELUTING INTRALUMINAL DEVICE, PERCUTANEOUS APPROACH: ICD-10-PCS | Performed by: INTERNAL MEDICINE

## 2021-02-26 PROCEDURE — 82077 ASSAY SPEC XCP UR&BREATH IA: CPT

## 2021-02-26 PROCEDURE — 6370000000 HC RX 637 (ALT 250 FOR IP): Performed by: PHYSICIAN ASSISTANT

## 2021-02-26 PROCEDURE — 6370000000 HC RX 637 (ALT 250 FOR IP): Performed by: FAMILY MEDICINE

## 2021-02-26 PROCEDURE — 96374 THER/PROPH/DIAG INJ IV PUSH: CPT

## 2021-02-26 PROCEDURE — 87635 SARS-COV-2 COVID-19 AMP PRB: CPT

## 2021-02-26 PROCEDURE — 6360000002 HC RX W HCPCS: Performed by: FAMILY MEDICINE

## 2021-02-26 PROCEDURE — 2500000003 HC RX 250 WO HCPCS: Performed by: EMERGENCY MEDICINE

## 2021-02-26 PROCEDURE — APPSS60 APP SPLIT SHARED TIME 46-60 MINUTES: Performed by: PHYSICIAN ASSISTANT

## 2021-02-26 PROCEDURE — 93010 ELECTROCARDIOGRAM REPORT: CPT | Performed by: INTERNAL MEDICINE

## 2021-02-26 PROCEDURE — 4A023N7 MEASUREMENT OF CARDIAC SAMPLING AND PRESSURE, LEFT HEART, PERCUTANEOUS APPROACH: ICD-10-PCS | Performed by: INTERNAL MEDICINE

## 2021-02-26 PROCEDURE — 84484 ASSAY OF TROPONIN QUANT: CPT

## 2021-02-26 PROCEDURE — 2580000003 HC RX 258: Performed by: INTERNAL MEDICINE

## 2021-02-26 PROCEDURE — 2580000003 HC RX 258

## 2021-02-26 PROCEDURE — 96372 THER/PROPH/DIAG INJ SC/IM: CPT

## 2021-02-26 RX ORDER — POLYETHYLENE GLYCOL 3350 17 G/17G
17 POWDER, FOR SOLUTION ORAL DAILY PRN
Status: DISCONTINUED | OUTPATIENT
Start: 2021-02-26 | End: 2021-02-27 | Stop reason: HOSPADM

## 2021-02-26 RX ORDER — LOSARTAN POTASSIUM 25 MG/1
25 TABLET ORAL DAILY
Status: DISCONTINUED | OUTPATIENT
Start: 2021-02-26 | End: 2021-02-27 | Stop reason: HOSPADM

## 2021-02-26 RX ORDER — ASPIRIN 81 MG/1
81 TABLET, CHEWABLE ORAL DAILY
Status: DISCONTINUED | OUTPATIENT
Start: 2021-02-27 | End: 2021-02-27 | Stop reason: HOSPADM

## 2021-02-26 RX ORDER — ACETAMINOPHEN 650 MG/1
650 SUPPOSITORY RECTAL EVERY 6 HOURS PRN
Status: DISCONTINUED | OUTPATIENT
Start: 2021-02-26 | End: 2021-02-27 | Stop reason: HOSPADM

## 2021-02-26 RX ORDER — DICYCLOMINE HCL 20 MG
20 TABLET ORAL EVERY 6 HOURS
Status: DISCONTINUED | OUTPATIENT
Start: 2021-02-26 | End: 2021-02-27 | Stop reason: HOSPADM

## 2021-02-26 RX ORDER — INSULIN GLARGINE 100 [IU]/ML
0.25 INJECTION, SOLUTION SUBCUTANEOUS NIGHTLY
Status: DISCONTINUED | OUTPATIENT
Start: 2021-02-26 | End: 2021-02-27 | Stop reason: HOSPADM

## 2021-02-26 RX ORDER — PANTOPRAZOLE SODIUM 40 MG/1
40 TABLET, DELAYED RELEASE ORAL DAILY
Status: DISCONTINUED | OUTPATIENT
Start: 2021-02-26 | End: 2021-02-27 | Stop reason: HOSPADM

## 2021-02-26 RX ORDER — DIAZEPAM 10 MG/1
10 TABLET ORAL EVERY 6 HOURS PRN
Status: ON HOLD | COMMUNITY
End: 2021-02-27 | Stop reason: HOSPADM

## 2021-02-26 RX ORDER — ACETAMINOPHEN 325 MG/1
650 TABLET ORAL EVERY 4 HOURS PRN
Status: DISCONTINUED | OUTPATIENT
Start: 2021-02-26 | End: 2021-02-27 | Stop reason: HOSPADM

## 2021-02-26 RX ORDER — DIAZEPAM 5 MG/1
10 TABLET ORAL EVERY 6 HOURS PRN
Status: DISCONTINUED | OUTPATIENT
Start: 2021-02-26 | End: 2021-02-27 | Stop reason: HOSPADM

## 2021-02-26 RX ORDER — ONDANSETRON 2 MG/ML
4 INJECTION INTRAMUSCULAR; INTRAVENOUS ONCE
Status: COMPLETED | OUTPATIENT
Start: 2021-02-26 | End: 2021-02-26

## 2021-02-26 RX ORDER — SODIUM CHLORIDE 0.9 % (FLUSH) 0.9 %
3 SYRINGE (ML) INJECTION EVERY 8 HOURS
Status: DISCONTINUED | OUTPATIENT
Start: 2021-02-26 | End: 2021-02-26 | Stop reason: HOSPADM

## 2021-02-26 RX ORDER — SODIUM CHLORIDE 9 MG/ML
INJECTION, SOLUTION INTRAVENOUS CONTINUOUS
Status: ACTIVE | OUTPATIENT
Start: 2021-02-26 | End: 2021-02-27

## 2021-02-26 RX ORDER — MORPHINE SULFATE 4 MG/ML
INJECTION, SOLUTION INTRAMUSCULAR; INTRAVENOUS
Status: COMPLETED
Start: 2021-02-26 | End: 2021-02-26

## 2021-02-26 RX ORDER — HYDRALAZINE HYDROCHLORIDE 20 MG/ML
10 INJECTION INTRAMUSCULAR; INTRAVENOUS EVERY 10 MIN PRN
Status: DISCONTINUED | OUTPATIENT
Start: 2021-02-26 | End: 2021-02-27 | Stop reason: HOSPADM

## 2021-02-26 RX ORDER — SODIUM CHLORIDE 0.9 % (FLUSH) 0.9 %
10 SYRINGE (ML) INJECTION EVERY 12 HOURS SCHEDULED
Status: DISCONTINUED | OUTPATIENT
Start: 2021-02-26 | End: 2021-02-27 | Stop reason: HOSPADM

## 2021-02-26 RX ORDER — DEXTROSE MONOHYDRATE 50 MG/ML
100 INJECTION, SOLUTION INTRAVENOUS PRN
Status: DISCONTINUED | OUTPATIENT
Start: 2021-02-26 | End: 2021-02-26 | Stop reason: SDUPTHER

## 2021-02-26 RX ORDER — GLIMEPIRIDE 2 MG/1
2 TABLET ORAL
Status: DISCONTINUED | OUTPATIENT
Start: 2021-02-26 | End: 2021-02-27 | Stop reason: HOSPADM

## 2021-02-26 RX ORDER — PROMETHAZINE HYDROCHLORIDE 12.5 MG/1
12.5 TABLET ORAL EVERY 6 HOURS PRN
Status: DISCONTINUED | OUTPATIENT
Start: 2021-02-26 | End: 2021-02-27 | Stop reason: HOSPADM

## 2021-02-26 RX ORDER — NICOTINE POLACRILEX 4 MG
15 LOZENGE BUCCAL PRN
Status: DISCONTINUED | OUTPATIENT
Start: 2021-02-26 | End: 2021-02-26 | Stop reason: SDUPTHER

## 2021-02-26 RX ORDER — MORPHINE SULFATE 2 MG/ML
2 INJECTION, SOLUTION INTRAMUSCULAR; INTRAVENOUS EVERY 4 HOURS PRN
Status: DISCONTINUED | OUTPATIENT
Start: 2021-02-26 | End: 2021-02-27 | Stop reason: HOSPADM

## 2021-02-26 RX ORDER — DEXTROSE MONOHYDRATE 50 MG/ML
100 INJECTION, SOLUTION INTRAVENOUS PRN
Status: DISCONTINUED | OUTPATIENT
Start: 2021-02-26 | End: 2021-02-27 | Stop reason: HOSPADM

## 2021-02-26 RX ORDER — DEXTROSE MONOHYDRATE 25 G/50ML
12.5 INJECTION, SOLUTION INTRAVENOUS PRN
Status: DISCONTINUED | OUTPATIENT
Start: 2021-02-26 | End: 2021-02-26 | Stop reason: SDUPTHER

## 2021-02-26 RX ORDER — NICOTINE POLACRILEX 4 MG
15 LOZENGE BUCCAL PRN
Status: DISCONTINUED | OUTPATIENT
Start: 2021-02-26 | End: 2021-02-27 | Stop reason: HOSPADM

## 2021-02-26 RX ORDER — ACETAMINOPHEN 325 MG/1
650 TABLET ORAL EVERY 6 HOURS PRN
Status: DISCONTINUED | OUTPATIENT
Start: 2021-02-26 | End: 2021-02-27 | Stop reason: HOSPADM

## 2021-02-26 RX ORDER — NITROGLYCERIN 0.4 MG/1
0.4 TABLET SUBLINGUAL EVERY 5 MIN PRN
Status: DISCONTINUED | OUTPATIENT
Start: 2021-02-26 | End: 2021-02-26 | Stop reason: HOSPADM

## 2021-02-26 RX ORDER — SERTRALINE HYDROCHLORIDE 100 MG/1
100 TABLET, FILM COATED ORAL DAILY
Status: DISCONTINUED | OUTPATIENT
Start: 2021-02-26 | End: 2021-02-27 | Stop reason: HOSPADM

## 2021-02-26 RX ORDER — LABETALOL HYDROCHLORIDE 5 MG/ML
10 INJECTION, SOLUTION INTRAVENOUS EVERY 30 MIN PRN
Status: DISCONTINUED | OUTPATIENT
Start: 2021-02-26 | End: 2021-02-27 | Stop reason: HOSPADM

## 2021-02-26 RX ORDER — ONDANSETRON 2 MG/ML
4 INJECTION INTRAMUSCULAR; INTRAVENOUS EVERY 6 HOURS PRN
Status: DISCONTINUED | OUTPATIENT
Start: 2021-02-26 | End: 2021-02-27 | Stop reason: HOSPADM

## 2021-02-26 RX ORDER — SODIUM CHLORIDE 9 MG/ML
INJECTION, SOLUTION INTRAVENOUS CONTINUOUS
Status: DISCONTINUED | OUTPATIENT
Start: 2021-02-26 | End: 2021-02-27 | Stop reason: HOSPADM

## 2021-02-26 RX ORDER — SODIUM CHLORIDE 0.9 % (FLUSH) 0.9 %
10 SYRINGE (ML) INJECTION PRN
Status: DISCONTINUED | OUTPATIENT
Start: 2021-02-26 | End: 2021-02-27 | Stop reason: HOSPADM

## 2021-02-26 RX ORDER — ATORVASTATIN CALCIUM 80 MG/1
80 TABLET, FILM COATED ORAL NIGHTLY
Status: DISCONTINUED | OUTPATIENT
Start: 2021-02-26 | End: 2021-02-27 | Stop reason: HOSPADM

## 2021-02-26 RX ORDER — MORPHINE SULFATE 4 MG/ML
4 INJECTION, SOLUTION INTRAMUSCULAR; INTRAVENOUS
Status: COMPLETED | OUTPATIENT
Start: 2021-02-26 | End: 2021-02-26

## 2021-02-26 RX ORDER — ASPIRIN 81 MG/1
324 TABLET, CHEWABLE ORAL ONCE
Status: COMPLETED | OUTPATIENT
Start: 2021-02-26 | End: 2021-02-26

## 2021-02-26 RX ORDER — NITROGLYCERIN 0.4 MG/1
0.4 TABLET SUBLINGUAL EVERY 5 MIN PRN
Status: DISCONTINUED | OUTPATIENT
Start: 2021-02-26 | End: 2021-02-27 | Stop reason: HOSPADM

## 2021-02-26 RX ORDER — LABETALOL HYDROCHLORIDE 5 MG/ML
10 INJECTION, SOLUTION INTRAVENOUS ONCE
Status: COMPLETED | OUTPATIENT
Start: 2021-02-26 | End: 2021-02-26

## 2021-02-26 RX ORDER — DEXTROSE MONOHYDRATE 25 G/50ML
12.5 INJECTION, SOLUTION INTRAVENOUS PRN
Status: DISCONTINUED | OUTPATIENT
Start: 2021-02-26 | End: 2021-02-27 | Stop reason: HOSPADM

## 2021-02-26 RX ADMIN — ACETAMINOPHEN 650 MG: 325 TABLET, FILM COATED ORAL at 16:31

## 2021-02-26 RX ADMIN — ENOXAPARIN SODIUM 120 MG: 120 INJECTION SUBCUTANEOUS at 04:41

## 2021-02-26 RX ADMIN — SERTRALINE 100 MG: 100 TABLET, FILM COATED ORAL at 08:01

## 2021-02-26 RX ADMIN — DICYCLOMINE HYDROCHLORIDE 20 MG: 20 TABLET ORAL at 20:45

## 2021-02-26 RX ADMIN — LABETALOL HYDROCHLORIDE 10 MG: 5 INJECTION, SOLUTION INTRAVENOUS at 03:45

## 2021-02-26 RX ADMIN — PANTOPRAZOLE SODIUM 40 MG: 40 TABLET, DELAYED RELEASE ORAL at 08:01

## 2021-02-26 RX ADMIN — INSULIN LISPRO 10 UNITS: 100 INJECTION, SOLUTION INTRAVENOUS; SUBCUTANEOUS at 13:02

## 2021-02-26 RX ADMIN — MORPHINE SULFATE 4 MG: 4 INJECTION, SOLUTION INTRAMUSCULAR; INTRAVENOUS at 05:35

## 2021-02-26 RX ADMIN — METOPROLOL TARTRATE 25 MG: 25 TABLET, FILM COATED ORAL at 20:45

## 2021-02-26 RX ADMIN — ACETAMINOPHEN 650 MG: 325 TABLET, FILM COATED ORAL at 08:05

## 2021-02-26 RX ADMIN — SODIUM CHLORIDE: 9 INJECTION, SOLUTION INTRAVENOUS at 20:45

## 2021-02-26 RX ADMIN — DICYCLOMINE HYDROCHLORIDE 20 MG: 20 TABLET ORAL at 08:01

## 2021-02-26 RX ADMIN — ASPIRIN 324 MG: 81 TABLET, CHEWABLE ORAL at 02:52

## 2021-02-26 RX ADMIN — ATORVASTATIN CALCIUM 80 MG: 80 TABLET, FILM COATED ORAL at 20:45

## 2021-02-26 RX ADMIN — MORPHINE SULFATE 4 MG: 4 INJECTION, SOLUTION INTRAMUSCULAR; INTRAVENOUS at 03:08

## 2021-02-26 RX ADMIN — NITROGLYCERIN 0.4 MG: 0.4 TABLET SUBLINGUAL at 02:52

## 2021-02-26 RX ADMIN — Medication 3 ML: at 03:10

## 2021-02-26 RX ADMIN — MORPHINE SULFATE 4 MG: 4 INJECTION, SOLUTION INTRAMUSCULAR; INTRAVENOUS at 04:42

## 2021-02-26 RX ADMIN — SODIUM CHLORIDE, PRESERVATIVE FREE 10 ML: 5 INJECTION INTRAVENOUS at 20:46

## 2021-02-26 RX ADMIN — IOVERSOL 180 ML: 678 INJECTION INTRA-ARTERIAL; INTRAVENOUS at 16:05

## 2021-02-26 RX ADMIN — INSULIN GLARGINE 28 UNITS: 100 INJECTION, SOLUTION SUBCUTANEOUS at 20:54

## 2021-02-26 RX ADMIN — NITROGLYCERIN 0.4 MG: 0.4 TABLET SUBLINGUAL at 03:04

## 2021-02-26 RX ADMIN — LOSARTAN POTASSIUM 25 MG: 25 TABLET, FILM COATED ORAL at 11:22

## 2021-02-26 RX ADMIN — MORPHINE SULFATE 2 MG: 2 INJECTION, SOLUTION INTRAMUSCULAR; INTRAVENOUS at 08:27

## 2021-02-26 RX ADMIN — NITROGLYCERIN 1 INCH: 20 OINTMENT TOPICAL at 04:41

## 2021-02-26 RX ADMIN — SODIUM CHLORIDE: 9 INJECTION, SOLUTION INTRAVENOUS at 12:59

## 2021-02-26 RX ADMIN — ONDANSETRON 4 MG: 2 INJECTION INTRAMUSCULAR; INTRAVENOUS at 03:07

## 2021-02-26 RX ADMIN — SODIUM CHLORIDE: 9 INJECTION, SOLUTION INTRAVENOUS at 23:40

## 2021-02-26 RX ADMIN — SODIUM CHLORIDE, PRESERVATIVE FREE 10 ML: 5 INJECTION INTRAVENOUS at 08:02

## 2021-02-26 RX ADMIN — METOPROLOL TARTRATE 25 MG: 25 TABLET, FILM COATED ORAL at 11:23

## 2021-02-26 RX ADMIN — NITROGLYCERIN 0.4 MG: 0.4 TABLET, ORALLY DISINTEGRATING SUBLINGUAL at 08:01

## 2021-02-26 SDOH — HEALTH STABILITY: MENTAL HEALTH: HOW OFTEN DO YOU HAVE A DRINK CONTAINING ALCOHOL?: NOT ASKED

## 2021-02-26 SDOH — HEALTH STABILITY: MENTAL HEALTH: HOW MANY STANDARD DRINKS CONTAINING ALCOHOL DO YOU HAVE ON A TYPICAL DAY?: NOT ASKED

## 2021-02-26 ASSESSMENT — ENCOUNTER SYMPTOMS
COLOR CHANGE: 0
VOMITING: 0
CHEST TIGHTNESS: 1
ABDOMINAL PAIN: 0
SHORTNESS OF BREATH: 1
NAUSEA: 0

## 2021-02-26 ASSESSMENT — PAIN SCALES - GENERAL
PAINLEVEL_OUTOF10: 5
PAINLEVEL_OUTOF10: 5
PAINLEVEL_OUTOF10: 3
PAINLEVEL_OUTOF10: 7

## 2021-02-26 ASSESSMENT — PAIN DESCRIPTION - LOCATION
LOCATION: CHEST
LOCATION: CHEST

## 2021-02-26 ASSESSMENT — PAIN DESCRIPTION - DESCRIPTORS: DESCRIPTORS: ACHING

## 2021-02-26 NOTE — CARE COORDINATION
Definition and description of a heart attack discussed. Brief overview of the heart anatomy reviewed with pt. CAD progression discussed. During a MI, lack of oxygen and damage to heart muscle explained. Symptoms of a MI reviewed. Pt. verbalizes that he experienced: chest pain on and off for about 1 month. It correlated with the time he quit his diabetes medications. The pain was worse yesterday and he began to feel a burning sensation in his throat, so he came to the ER. Post MI complications reviewed including arrhythmias, pumping problems, inflammation (pericarditis). Hospital course reviewed including testing: Lab work, B/P, ECG, ECHO, Stress testing, and Heart Cath. Treatments also reviewed from medication, angioplasty and stenting, to CABG. Patient is scheduled for a heart cath with possible PCI today. Plan post discharge includes: F/U with cardiology, cardiac rehab, and diet modification. Activity discussed including cardiac rehab. Pt advised to follow his physician discharge instructions for the specific amount of activity they are to be doing. Risk factors reviewed including: smoking, high cholesterol, HTN, DM, obesity, sedentary lifestyle, and stress. Pt. encouraged to make lifestyle changes to improve his health and lower these risk factors. Stress and depression were also discussed. S/S depression reviewed as well as encouragement to talk with someone if symptoms are noticed. Importance of follow up with the cardiologist reinforced. MI Zone booklet also reviewed. Goal is to keep patient in the \"green\" zone. He verbalizes understanding to call the doctor ASAP when experiencing symptoms in the \"yellow\" zone. Instructed to call 911 when S/S of \"red\" zone begin. Pt denies any further questions at this time.    Electronically signed by Leopoldo Mins, RN on 2/26/2021 at 9:35 AM

## 2021-02-26 NOTE — PROGRESS NOTES
Patient resting comfortably. Right wrist checked and hematoma superior to vasc band. Manual pressure held for 10 min. Site soft after holding. Vasc band maintained. Will continue to monitor.

## 2021-02-26 NOTE — ED NOTES
Transfer center called with a bed 174 and [facility] called for transport.  ETA 30 mins     Caprice Messina RN  02/26/21 7781

## 2021-02-26 NOTE — PROGRESS NOTES
1740: 2cc of air removed from vasc band. Site soft and stable. 1755: 2 more cc of air removed. 1805: Last remaining 2cc of air removed from vasc band. Site stable. Will continue to monitor.

## 2021-02-26 NOTE — H&P
Hospital Medicine  History and Physical    Patient:  Derik Le  MRN: [id number]    CHIEF COMPLAINT:  No chief complaint on file. History Obtained From:  patient  Primary Care Physician: Dina Robison MD    HISTORY OF PRESENT ILLNESS:   The patient is a 50 y.o. male who presents with a history of diabetes most type II, hyperlipidemia, GERD, alcoholism who presents to [facility] emergency room and transferred to [facility] [place] for admission for intermittent but progressively worsening substernal left-sided chest pain with radiation to the neck. Patient states this is been occurring over the past 2 weeks, worse with activity. He has no cardiac history but does report several months ago several episodes of palpitations. He also reports that several weeks ago he stopped taking medications because he was \"sick of it\". Past Medical History:      Diagnosis Date    Alcohol abuse     Diverticulitis     GERD (gastroesophageal reflux disease)     Obesity 7/9/2015    Other hyperlipidemia 2/16/2018    Type 2 diabetes mellitus without complication, without long-term current use of insulin ([facility] [address] 1/30/2017       Past Surgical History:      Procedure Laterality Date    COLONOSCOPY  5/11/09    DR CROSS    HERNIA REPAIR      Surgical hernia    REVISION COLOSTOMY  04/01/13    Exp lap, lysis adhesions, takedown transverse colostomy, creation of colo-colo anstomosis, diverting loop ileostomy     SIGMOIDOSCOPY  7/5/13    Loop ileostomy closure [facility]     UPPER GASTROINTESTINAL ENDOSCOPY  5/11/09    DR CROSS       Medications Prior to Admission:    Prior to Admission medications    Medication Sig Start Date End Date Taking? Authorizing Provider   diazePAM (VALIUM) 10 MG tablet Take 10 mg by mouth every 6 hours as needed for Anxiety.    Yes Historical Provider, MD   sertraline (ZOLOFT) 100 MG tablet TAKE 1 TABLET DAILY (NEED APPOINTMENT FOR REFILLS) 1/28/19  Yes Dina Robison MD   omega-3 acid

## 2021-02-26 NOTE — ED PROVIDER NOTES
Name: Grey Griffin  Age: 50 y.o. Gender: male  CodeStatus: No Order  Allergies: No Known Allergies    Chief Complaint:Chest Pain    Primary Care Provider: Andreia Davis MD  Date of Service: [unfilled]     Past Medical History:   Diagnosis Date    Alcohol abuse     Diverticulitis     GERD (gastroesophageal reflux disease)     Obesity 7/9/2015    Other hyperlipidemia 2/16/2018    Type 2 diabetes mellitus without complication, without long-term current use of insulin ([facility] [address] 1/30/2017      Past Surgical History:   Procedure Laterality Date    COLONOSCOPY  5/11/09    DR Neftaly Mckeon    HERNIA REPAIR      Surgical hernia    REVISION COLOSTOMY  04/01/13    Exp lap, lysis adhesions, takedown transverse colostomy, creation of colo-colo anstomosis, diverting loop ileostomy     SIGMOIDOSCOPY  7/5/13    Loop ileostomy closure [facility]     UPPER GASTROINTESTINAL ENDOSCOPY  5/11/09    DR Neftaly Mckeon        Medications:  Reviewed    Infusion Medications:   Scheduled Medications:    sodium chloride flush  3 mL Intravenous Q8H    labetalol  10 mg Intravenous Once    enoxaparin  1 mg/kg Subcutaneous Once     PRN Meds: morphine, nitroGLYCERIN    Subjective:     CC/HPI: 26-year-old male to the emergency department chief complaint of anterior chest pain. Patient has a history of diabetes, high blood pressure anxiety depression hyperlipidemia obesity and previous smoker, quit in 2012. Patient admits to not taking his diabetes medication for approximately 2 weeks beginning approximately a month ago. Patient states he was just tired of taking all his medications. At the end of the 2 weeks patient states he felt like his sugars were elevated and then approximately 2 weeks ago began having anterior chest discomfort. Patient states that occasionally goes up into his anterior neck area. He denies shortness of breath. No nausea no vomiting. No cough or URI symptoms no recent travel no calf pain or swelling.   Patient was seen by his primary care physician on Monday. Patient states he had outpatient lab work done and was referred to cardiology. Patient was seen by Dr. Kenji Weems this morning. Patient states he was scheduled for an outpatient stress test 2 weeks from now. After going home today at around 530 or 6 PM patient states that his chest discomfort worsened up to a 7 out of 10 and has been steady since that time. VITALS/PMH/PSH: Reviewed per nurses notes    REVIEW OF SYSTEMS: As in chief complaint history of present illness, otherwise all other systems are reviewed and negative the total 10 systems reviewed    GEN: Pt alert and oriented, no acute distress. Appears mildly uncomfortable  HEENT:         Normocephalic/Atramatic        PERRL, EOMI       Throat nonerythematous or edematous. No exudates noted. Moist membranes  NECK: Nontender, no signs of trauma, no lymphadenopathy  HEART: Reg S1/S2, without murmer, rub or gallop  Chest wall; nontender to palpation  LUNGS: Clear to auscultation bilaterally, respirations even and unlabored  ABDOMEN: Bowel sounds positive times 4, soft, nontender, nondistended. No guarding rebound or rigidity  MUSCULOSKELETAL/EXTREMITITES:  No signs of trauma, cyanosis or edema. No CVA tenderness. No calf swelling or tenderness  LYMPH: no peripheral lympadenopathy noted  SKIN:  Warm & dry, no rash  NEUROLOGIC:  Alert and oriented.   Speech clear        Labs:   Recent Labs     02/26/21 0256   WBC 8.3   HGB 15.1   HCT 43.4        Recent Labs     02/26/21 0256      K 4.4   CL 97   CO2 24   BUN 15   CREATININE 0.78   CALCIUM 9.6     Recent Labs     02/26/21 0256   AST 40   ALT 55*   BILITOT 0.7   ALKPHOS 90     Recent Labs     02/26/21  0256   INR 0.9     Recent Labs     02/26/21 0256   TROPONINI 0.326*        Urinalysis:   Lab Results   Component Value Date    NITRU NEG 10/29/2012    BLOODU neg 01/26/2017    BLOODU NEG 10/29/2012    SPECGRAV 1.020 01/26/2017    SPECGRAV 1.025 10/29/2012    GLUCOSEU 1,000 01/26/2017    GLUCOSEU NEG 10/29/2012       Radiology:   Most recent    Chest CT      WITH CONTRAST:No results found for this or any previous visit. WITHOUT CONTRAST: No results found for this or any previous visit. Results for orders placed during the hospital encounter of 08/16/12   CT abdomen pelvis with and without contrast    Narrative EXAMINATION  CT ABDOMEN AND PELVIS WITHOUT AND WITH IV CONTRAST     CLINICAL INFORMATION  FLANK AND ABDOMINAL PAIN. TECHNIQUE  5 mm transaxial, 4 mm sagittal and coronal images were  obtained, pre-and post  cc of [id number]. FINDINGS     There many diverticula of entire sigmoid colon. There is  grossly thickened walls of entire sigmoid colon. There is inflammatory  stranding in visceral fat surrounding the entire sigmoid colon. There is 5 cm abscess from anterior aspect of rectosigmoid colon,  posterior to the urinary bladder. The prostate gland is moderately large, 5 cm in size. No other active process is identified in the abdominal organs,  retroperitoneum and intra-peritoneum. CONCLUSIONS      SIGMOID DIVERTICULITIS INVOLVING ENTIRE SIGMOID COLON. THERE IS A 5 CM  ABSCESS  ANTERIOR TO THE RECTOSIGMOID AND POSTERIOR TO THE URINARY  BLADDER. Marylen Modest M.D. Released ByCarolina Leija M.D. Released Date Time- 08/17/12 2580   This document has been electronically signed. ------------------------------------------------------------------------------        CXR      2-view: No results found for this or any previous visit. Portable:   Results for orders placed during the hospital encounter of 11/07/19   XR CHEST PORTABLE    Narrative EXAMINATION: XR CHEST PORTABLE    CLINICAL HISTORY: Chest pain    COMPARISONS: None available.     FINDINGS: Cardiac size and pulmonary vascularity are normal. Mediastinal contour and density is consistent with lipomatosis. The lungs are clear. The bones are unremarkable. Impression NO ACUTE CHEST DISEASE       Medical decision making/ED course;  Patient was immediately taken to room 8 and placed on a monitor. Sinus rhythm was noted without ectopy. EKG was obtained and was interpreted by me as showing normal sinus rhythm at 95 bpm with questionable left atrial enlargement. Right bundle branch block was noted which patient's wife states that previous EKGs that showed a bundle branch block. Chest x-ray was interpreted by me as showing no signs of acute pulmonary disease. IV was established and lab work was obtained which showed a troponin elevated at 0.326    Patient had taken 1 baby aspirin at approximately 6 PM, so was given 325 mg of aspirin p.o., 2 sublingual nitroglycerin, and 4 mg of IV morphine with 4 mg of IV Zofran with improvement of his discomfort. Repeat examination patient stated he felt much better. Pain was down to approximately a 3. Blood pressure was initially 191/112, which gradually improved with medications. Patient was also given 10 mg of IV labetalol. Critical care time noninclusive of procedures was 60 minutes    Repeat EKG showed normal sinus rhythm at 89 bpm with probable left atrial enlargement. Right bundle branch block. No significant change from previous    Clinical impression;  1)  Non ST Elevation MI  2)  Hx Diabetes with non-compliance with medications  3)  Hx HTN and hyperlipidemia with remote smoking hx. Disposition/plan;  Discussed with Dr. Marla Purdy, who wished cardiology to be consulted. There was a significant delay as after the transfer line was informed that we needed cardiology paged we did not hear from them. When our nurse called we were informed that the transfer line had not paged cardiology. Will transfer to [facility] [place] for further evaluation and treatment.   Electronically signed by Johnna Leo DO on 2/26/2021 at 3:42 AM      Chandu Abbott

## 2021-02-26 NOTE — FLOWSHEET NOTE
Pt to floor via [facility]. Pt's home medication list updated. Dr. Alvina Hunt notified. Pt still having 4/10 chest pain. Pt's call light within reach.

## 2021-02-26 NOTE — CONSULTS
Consult Note  Patient: Samantha Roach  Unit/Bed: V099/B965-95  YOB: 1975  MRN: [id number]  Acct: [de-identified]   Admitting Diagnosis: NSTEMI (non-ST elevated myocardial infarction) [facility]) [I21.4]  Date:  2/26/2021  Hospital Day: 0      Chief Complaint:  Chest pain    History of Present Illness: This is a very pleasant 38-year-old  male with past medical history significant for diabetes, former tobacco abuse and alcohol abuse who presented to the emergency room yesterday with chief complaint of chest pain. Around the beginning of the month, patient states that he had decided he did not want to take his diabetic medications anymore and began having issues with hyperglycemia. Shortly thereafter, he began experiencing intermittent episodes of midsternal to left-sided chest pain which he described as pressure and heaviness and also a burning discomfort. These episodes of chest pain would occur sporadically but within the past week he noticed more episodes of chest pain with exertion at work. 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 This was associated with shortness of breath. He denied nausea, vomiting, diaphoresis, palpitations, paroxysmal nocturnal dyspnea, orthopnea, lower extremity edema, syncope, fever or chills. Due to this recurrent and now persistent pain he presented to ER for further evaluation. Patient was hemodynamically stable on presentation to ER. Sodium 136, potassium 4.4, chloride 97, total CO2 24, BUN 15, creatinine 0.78, GFR greater than 60, glucose elevated 236. Troponin elevated 0.326. Hernandez level negative. WBC 8.3, hemoglobin 15.1, hematocrit 43.4, platelets 958. Rapid Covid testing negative. Chest x-ray revealed no radiographic evidence of acute intrathoracic process.   EKG showed sinus rhythm with ventricular rate 95 bpm, right bundle branch block, ST Steve Lundborg, MD        Or    ondansetron [facility]) injection 4 mg  4 mg Intravenous Q6H PRN Steve Lundborg, MD        acetaminophen (TYLENOL) tablet 650 mg  650 mg Oral Q6H PRN Steve Lundborg, MD        Or    acetaminophen (TYLENOL) suppository 650 mg  650 mg Rectal Q6H PRN Steve Lundborg, MD        polyethylene glycol [facility]) packet 17 g  17 g Oral Daily PRN Steve Lundborg, MD        [START ON 2/27/2021] aspirin chewable tablet 81 mg  81 mg Oral Daily Steve Lundborg, MD        atorvastatin (LIPITOR) tablet 80 mg  80 mg Oral Nightly Steve Lundborg, MD        enoxaparin (LOVENOX) injection 105 mg  1 mg/kg Subcutaneous BID Steve Lundborg, MD        nitroGLYCERIN (NITROSTAT) SL tablet 0.4 mg  0.4 mg Sublingual Q5 Min PRN Steve Lundborg, MD           PMHx:  Past Medical History:   Diagnosis Date    Alcohol abuse     Diverticulitis     GERD (gastroesophageal reflux disease)     Obesity 7/9/2015    Other hyperlipidemia 2/16/2018    Type 2 diabetes mellitus without complication, without long-term current use of insulin [facility] 1/30/2017       PSHx:  Past Surgical History:   Procedure Laterality Date    COLONOSCOPY  5/11/09    DR Hebert Ashford    HERNIA REPAIR      Surgical hernia    REVISION COLOSTOMY  04/01/13    Exp lap, lysis adhesions, takedown transverse colostomy, creation of colo-colo anstomosis, diverting loop ileostomy     SIGMOIDOSCOPY  7/5/13    Loop ileostomy closure [facility]     UPPER GASTROINTESTINAL ENDOSCOPY  5/11/09    DR CROSS       Social Hx:  Social History     Socioeconomic History    Marital status:      Spouse name: Not on file    Number of children: Not on file    Years of education: Not on file    Highest education level: Not on file   Occupational History     Employer: [employer]   Social Needs    Financial resource strain: Not on file    Food insecurity     Worry: Not on file     Inability: Not on file    Transportation needs     Medical: Not on file     Non-medical: Not on file   Tobacco Use    Smoking status: Former Smoker     Packs/day: 0.50    Smokeless tobacco: Never Used   Substance and Sexual Activity    Alcohol use: Yes     Alcohol/week: 18.0 standard drinks     Types: 18 Cans of beer per week    Drug use: Never    Sexual activity: Not on file   Lifestyle    Physical activity     Days per week: Not on file     Minutes per session: Not on file    Stress: Not on file   Relationships    Social connections     Talks on phone: Not on file     Gets together: Not on file     Attends Bahai service: Not on file     Active member of club or organization: Not on file     Attends meetings of clubs or organizations: Not on file     Relationship status: Not on file    Intimate partner violence     Fear of current or ex partner: Not on file     Emotionally abused: Not on file     Physically abused: Not on file     Forced sexual activity: Not on file   Other Topics Concern    Not on file   Social History Narrative    Not on file       Family Hx:  Family History   Problem Relation Age of Onset    High Blood Pressure Mother        Review of Systems:   Review of Systems   Constitutional: Negative for activity change, fatigue and fever. HENT: Negative for congestion. Respiratory: Positive for chest tightness and shortness of breath. Cardiovascular: Positive for chest pain. Negative for palpitations and leg swelling. Gastrointestinal: Negative for abdominal pain, nausea and vomiting. Genitourinary: Negative for difficulty urinating. Musculoskeletal: Negative for arthralgias. Skin: Negative for color change, pallor and rash. Neurological: Negative for dizziness, syncope and light-headedness. Psychiatric/Behavioral: Negative for agitation and behavioral problems.          Physical Examination:    BP (!) 138/98   Pulse 92   Temp 97.6 °F (36.4 °C) (Oral)   Resp 18   Ht 5' 10.5\" (1.791 m)   Wt 247 lb 12.8 oz (112.4 kg)   BMI 35.05 kg/m²    Physical Exam  Constitutional:       General: He is not in acute distress. Appearance: Normal appearance. He is obese. HENT:      Head: Normocephalic and atraumatic. Neck:      Musculoskeletal: Normal range of motion and neck supple. Cardiovascular:      Rate and Rhythm: Normal rate and regular rhythm. Heart sounds: No murmur. Pulmonary:      Effort: Pulmonary effort is normal. No respiratory distress. Breath sounds: No wheezing, rhonchi or rales. Abdominal:      Palpations: Abdomen is soft. Tenderness: There is no abdominal tenderness. Musculoskeletal: Normal range of motion. Right lower leg: No edema. Left lower leg: No edema. Skin:     General: Skin is warm and dry. Neurological:      General: No focal deficit present. Mental Status: He is alert and oriented to person, place, and time. Cranial Nerves: No cranial nerve deficit.    Psychiatric:         Mood and Affect: Mood normal.         Behavior: Behavior normal.         LABS:  CBC:  Lab Results   Component Value Date    WBC 8.3 02/26/2021    RBC 4.76 02/26/2021    RBC 4.64 09/17/2011    HGB 15.1 02/26/2021    HCT 43.4 02/26/2021    MCV 91.2 02/26/2021    MCH 31.7 02/26/2021    MCHC 34.8 02/26/2021    RDW 13.4 02/26/2021     02/26/2021    MPV 6.7 02/16/2013     CBC with Differential:   Lab Results   Component Value Date    WBC 8.3 02/26/2021    RBC 4.76 02/26/2021    RBC 4.64 09/17/2011    HGB 15.1 02/26/2021    HCT 43.4 02/26/2021     02/26/2021    MCV 91.2 02/26/2021    MCH 31.7 02/26/2021    MCHC 34.8 02/26/2021    RDW 13.4 02/26/2021    LYMPHOPCT 19.8 02/26/2021    MONOPCT 5.6 02/26/2021    EOSPCT 2.9 09/17/2011    BASOPCT 0.3 02/26/2021    MONOSABS 0.5 02/26/2021    LYMPHSABS 1.7 02/26/2021    EOSABS 0.2 02/26/2021    BASOSABS 0.0 02/26/2021     CMP:    Lab Results   Component Value Date     02/26/2021    K 4.4 02/26/2021    CL 97 02/26/2021    CO2 24 02/26/2021    BUN 15 02/26/2021    CREATININE 0.78 02/26/2021    GFRAA >60.0 02/26/2021    LABGLOM >60.0 02/26/2021    GLUCOSE 236 02/26/2021    GLUCOSE 94 09/17/2011    PROT 7.3 02/26/2021    LABALBU 4.5 02/26/2021    LABALBU 4.7 09/17/2011    CALCIUM 9.6 02/26/2021    BILITOT 0.7 02/26/2021    ALKPHOS 90 02/26/2021    AST 40 02/26/2021    ALT 55 02/26/2021     BMP:    Lab Results   Component Value Date     02/26/2021    K 4.4 02/26/2021    CL 97 02/26/2021    CO2 24 02/26/2021    BUN 15 02/26/2021    LABALBU 4.5 02/26/2021    LABALBU 4.7 09/17/2011    CREATININE 0.78 02/26/2021    CALCIUM 9.6 02/26/2021    GFRAA >60.0 02/26/2021    LABGLOM >60.0 02/26/2021    GLUCOSE 236 02/26/2021    GLUCOSE 94 09/17/2011     Magnesium:  No results found for: MG  Troponin:    Lab Results   Component Value Date    TROPONINI 0.326 02/26/2021       Radiology:  Xr Chest Portable    Result Date: 2/26/2021  Exam: XR CHEST PORTABLE History: Chest pain Technique: AP portable view of the chest obtained. Comparison: Portable chest radiograph from November 7, 2019 Findings: The cardiomediastinal silhouette is within normal limits. No pneumothorax, pleural effusion, or consolidation. Bones of the thorax appear intact. No radiographic evidence of acute intrathoracic process. EKG 2/26/21: SR 89, RBBB, ST depression with T wave inversion in V1-V3 and lead III, QTc 491ms    Telemetry 2/26/21: SR 80s      Assessment:    Active Hospital Problems    Diagnosis Date Noted    NSTEMI (non-ST elevated myocardial infarction) ([facility] [address] [I21.4] 02/26/2021     1. NSTEMI  2. CP/Unstable angina  3. DM, Uncontrolled  4. Alcohol abuse  5. Abnormal EKG  6. Former smoker  7. Family Hx CAD        Plan:  1. ACS orders initiated  2. Maximize medical therapy-aspirin 81 mg p.o. daily, add Lopressor 25 mg p.o. twice daily, continue Lipitor 80 mg p.o. daily for now, add losartan 25 mg p.o. daily, sublingual nitroglycerin as needed for chest pain  3.  Hold Lovenox for now in anticipation for left heart cath  4. Check echocardiogram  5. Cardiac diet recommended  6. Monitor on telemetry for any tachycardia or bradycardia arrhythmias  7. Maintain potassium greater than 4, magnesium greater than 2  8. GI/DVT prophylaxis  9. Alcohol cessation strongly recommended  10. Plan for cardiac catheterization this afternoon with Dr. Kleber Gr  11.  Further recommendations to follow            Electronically signed by VIDAL Hays on 2/26/2021 at 8:00 AM

## 2021-02-26 NOTE — PROGRESS NOTES
1815: Vasc band removed and replaced with Quik clot and Tegaderm dressing. Some swelling noted, but site is not hard or bruised. Will continue to monitor. 1821: Report called to Elisa Davis RN on 1W. Transport put in.

## 2021-02-26 NOTE — ED NOTES
Called the transfer center to check on cardiology consult, they had not yet paged them and state they are paging them at this time.       Gopal Patel RN  02/26/21 [id number]

## 2021-02-27 VITALS
SYSTOLIC BLOOD PRESSURE: 155 MMHG | WEIGHT: 252.4 LBS | HEART RATE: 86 BPM | HEIGHT: 71 IN | DIASTOLIC BLOOD PRESSURE: 94 MMHG | TEMPERATURE: 98.6 F | BODY MASS INDEX: 35.34 KG/M2 | OXYGEN SATURATION: 96 % | RESPIRATION RATE: 16 BRPM

## 2021-02-27 LAB
ANION GAP SERPL CALCULATED.3IONS-SCNC: 11 MEQ/L (ref 9–15)
BUN BLDV-MCNC: 9 MG/DL (ref 6–20)
CALCIUM SERPL-MCNC: 8.8 MG/DL (ref 8.5–9.9)
CHLORIDE BLD-SCNC: 99 MEQ/L (ref 95–107)
CO2: 23 MEQ/L (ref 20–31)
CREAT SERPL-MCNC: 0.69 MG/DL (ref 0.7–1.2)
EKG ATRIAL RATE: 85 BPM
EKG P AXIS: 59 DEGREES
EKG P-R INTERVAL: 182 MS
EKG Q-T INTERVAL: 402 MS
EKG QRS DURATION: 158 MS
EKG QTC CALCULATION (BAZETT): 478 MS
EKG R AXIS: 77 DEGREES
EKG T AXIS: 26 DEGREES
EKG VENTRICULAR RATE: 85 BPM
GFR AFRICAN AMERICAN: >60
GFR NON-AFRICAN AMERICAN: >60
GLUCOSE BLD-MCNC: 191 MG/DL (ref 70–99)
GLUCOSE BLD-MCNC: 205 MG/DL (ref 60–115)
GLUCOSE BLD-MCNC: 245 MG/DL (ref 60–115)
HCT VFR BLD CALC: 39.4 % (ref 42–52)
HEMOGLOBIN: 13.8 G/DL (ref 14–18)
MCH RBC QN AUTO: 31.7 PG (ref 27–31.3)
MCHC RBC AUTO-ENTMCNC: 35 % (ref 33–37)
MCV RBC AUTO: 90.7 FL (ref 80–100)
PDW BLD-RTO: 13.6 % (ref 11.5–14.5)
PERFORMED ON: ABNORMAL
PERFORMED ON: ABNORMAL
PLATELET # BLD: 144 K/UL (ref 130–400)
POTASSIUM SERPL-SCNC: 3.9 MEQ/L (ref 3.4–4.9)
RBC # BLD: 4.35 M/UL (ref 4.7–6.1)
SODIUM BLD-SCNC: 133 MEQ/L (ref 135–144)
WBC # BLD: 8.3 K/UL (ref 4.8–10.8)

## 2021-02-27 PROCEDURE — 6370000000 HC RX 637 (ALT 250 FOR IP): Performed by: INTERNAL MEDICINE

## 2021-02-27 PROCEDURE — 99232 SBSQ HOSP IP/OBS MODERATE 35: CPT | Performed by: INTERNAL MEDICINE

## 2021-02-27 PROCEDURE — 6370000000 HC RX 637 (ALT 250 FOR IP): Performed by: PHYSICIAN ASSISTANT

## 2021-02-27 PROCEDURE — 80048 BASIC METABOLIC PNL TOTAL CA: CPT

## 2021-02-27 PROCEDURE — C1769 GUIDE WIRE: HCPCS

## 2021-02-27 PROCEDURE — 2580000003 HC RX 258: Performed by: PHYSICIAN ASSISTANT

## 2021-02-27 PROCEDURE — 85027 COMPLETE CBC AUTOMATED: CPT

## 2021-02-27 PROCEDURE — 36415 COLL VENOUS BLD VENIPUNCTURE: CPT

## 2021-02-27 PROCEDURE — 6370000000 HC RX 637 (ALT 250 FOR IP): Performed by: FAMILY MEDICINE

## 2021-02-27 PROCEDURE — 93005 ELECTROCARDIOGRAM TRACING: CPT | Performed by: FAMILY MEDICINE

## 2021-02-27 RX ORDER — NITROGLYCERIN 0.4 MG/1
TABLET SUBLINGUAL
Qty: 25 TABLET | Refills: 3 | Status: SHIPPED | OUTPATIENT
Start: 2021-02-27

## 2021-02-27 RX ORDER — LOSARTAN POTASSIUM 25 MG/1
50 TABLET ORAL DAILY
Qty: 30 TABLET | Refills: 6 | Status: SHIPPED | OUTPATIENT
Start: 2021-02-27 | End: 2021-03-30

## 2021-02-27 RX ORDER — ATORVASTATIN CALCIUM 80 MG/1
80 TABLET, FILM COATED ORAL NIGHTLY
Qty: 30 TABLET | Refills: 3 | Status: SHIPPED | OUTPATIENT
Start: 2021-02-27 | End: 2021-03-30 | Stop reason: SDUPTHER

## 2021-02-27 RX ORDER — LOSARTAN POTASSIUM 25 MG/1
25 TABLET ORAL DAILY
Qty: 30 TABLET | Refills: 3 | Status: SHIPPED | OUTPATIENT
Start: 2021-02-27 | End: 2021-03-30 | Stop reason: SDUPTHER

## 2021-02-27 RX ORDER — ASPIRIN 81 MG/1
81 TABLET, CHEWABLE ORAL DAILY
Qty: 30 TABLET | Refills: 3 | Status: SHIPPED | OUTPATIENT
Start: 2021-02-27 | End: 2021-03-30 | Stop reason: SDUPTHER

## 2021-02-27 RX ADMIN — DICYCLOMINE HYDROCHLORIDE 20 MG: 20 TABLET ORAL at 02:21

## 2021-02-27 RX ADMIN — DICYCLOMINE HYDROCHLORIDE 20 MG: 20 TABLET ORAL at 09:31

## 2021-02-27 RX ADMIN — ASPIRIN 81 MG: 81 TABLET, CHEWABLE ORAL at 09:31

## 2021-02-27 RX ADMIN — LOSARTAN POTASSIUM 25 MG: 25 TABLET, FILM COATED ORAL at 09:31

## 2021-02-27 RX ADMIN — PANTOPRAZOLE SODIUM 40 MG: 40 TABLET, DELAYED RELEASE ORAL at 09:31

## 2021-02-27 RX ADMIN — METOPROLOL TARTRATE 25 MG: 25 TABLET, FILM COATED ORAL at 09:31

## 2021-02-27 RX ADMIN — SODIUM CHLORIDE, PRESERVATIVE FREE 10 ML: 5 INJECTION INTRAVENOUS at 09:31

## 2021-02-27 RX ADMIN — TICAGRELOR 90 MG: 90 TABLET ORAL at 02:21

## 2021-02-27 RX ADMIN — SERTRALINE 100 MG: 100 TABLET, FILM COATED ORAL at 09:31

## 2021-02-27 NOTE — CARE COORDINATION
MET WITH PATIENT TO DISCUSS DC PLANS. PT LIVES WITH WIFE, CHILD. INDEPENDENT. DENIES NEEDS. BRILINTA CARDS ON CHART, UPDATED NURSING AND PATIENT. WILL FOLLOW IF NEEDED.

## 2021-02-27 NOTE — PROGRESS NOTES
CC: CAD, CP    SUBJECTIVE: Status post PCI of the RCA with drug-eluting stent. Doing well today. Denies any chest pain or shortness of breath. Hemodynamically stable. On dual antiplatelet therapy.         Past Medical History:   Diagnosis Date    Alcohol abuse     Diverticulitis     GERD (gastroesophageal reflux disease)     Obesity 7/9/2015    Other hyperlipidemia 2/16/2018    Type 2 diabetes mellitus without complication, without long-term current use of insulin ([facility] [address] 1/30/2017     Patient Active Problem List   Diagnosis    GERD (gastroesophageal reflux disease)    Alcohol abuse    Diverticulitis    Depression    Anxiety    Obesity    Type 2 diabetes mellitus without complication, without long-term current use of insulin ([address]    Other hyperlipidemia    NSTEMI (non-ST elevated myocardial infarction) ([facility]       Current Facility-Administered Medications   Medication Dose Route Frequency Provider Last Rate Last Admin    diazePAM (VALIUM) tablet 10 mg  10 mg Oral Q6H PRN Jose Lynch MD        dicyclomine (BENTYL) tablet 20 mg  20 mg Oral Q6H Jose Lynch MD   20 mg at 02/27/21 0931    glimepiride (AMARYL) tablet 2 mg  2 mg Oral Daily with breakfast Jose Lynch MD        pantoprazole (PROTONIX) tablet 40 mg  40 mg Oral Daily Jose Lynch MD   40 mg at 02/27/21 0931    sertraline (ZOLOFT) tablet 100 mg  100 mg Oral Daily Jose Lynch MD   100 mg at 02/27/21 0931    sodium chloride flush 0.9 % injection 10 mL  10 mL Intravenous 2 times per day Jose Lynch MD   Stopped at 02/27/21 0935    sodium chloride flush 0.9 % injection 10 mL  10 mL Intravenous PRN Jose Lynch MD        [facility]) tablet 12.5 mg  12.5 mg Oral Q6H PRN Jose Lynch MD        Or    ondansetron [facility]) injection 4 mg  4 mg Intravenous Q6H PRN Jose Lynch MD        acetaminophen (TYLENOL) tablet 650 mg  650 mg Oral Q6H PRN Jose Lynch MD   650 mg at 02/26/21 1631    Or    acetaminophen (TYLENOL) suppository 650 mg  650 mg Rectal Q6H PRN Norma Thorpe MD        polyethylene glycol [facility]) packet 17 g  17 g Oral Daily PRN Norma Thorpe MD        aspirin chewable tablet 81 mg  81 mg Oral Daily Norma Thorpe MD   81 mg at 02/27/21 0931    atorvastatin (LIPITOR) tablet 80 mg  80 mg Oral Nightly Norma Thorpe MD   80 mg at 02/26/21 2045    [Held by provider] enoxaparin (LOVENOX) injection 105 mg  1 mg/kg Subcutaneous BID Norma Thorpe MD        nitroGLYCERIN (NITROSTAT) SL tablet 0.4 mg  0.4 mg Sublingual Q5 Min PRN Norma Thorpe MD   0.4 mg at 02/26/21 0801    morphine (PF) injection 2 mg  2 mg Intravenous Q4H PRN Norma Thorpe MD   2 mg at 02/26/21 0827    metoprolol tartrate (LOPRESSOR) tablet 25 mg  25 mg Oral BID Bam Waters   25 mg at 02/27/21 0931    losartan (COZAAR) tablet 25 mg  25 mg Oral Daily Suraj Watersma   25 mg at 02/27/21 0931    0.9 % sodium chloride infusion   Intravenous Continuous Bam Waters 75 mL/hr at 02/26/21 1259 New Bag at 02/26/21 1259    sodium chloride flush 0.9 % injection 10 mL  10 mL Intravenous 2 times per day VIDAL Waters   10 mL at 02/27/21 0931    sodium chloride flush 0.9 % injection 10 mL  10 mL Intravenous PRN VIDAL Waters        glucose (GLUTOSE) 40 % oral gel 15 g  15 g Oral PRN Norma Thorpe MD        dextrose 50 % IV solution  12.5 g Intravenous PRN Norma Thorpe MD        glucagon (rDNA) injection 1 mg  1 mg Intramuscular PRN Norma Thorpe MD        dextrose 5 % solution  100 mL/hr Intravenous PRN Norma Thorpe MD        insulin glargine (LANTUS) injection vial 28 Units  0.25 Units/kg Subcutaneous Nightly Norma Thorpe MD   28 Units at 02/26/21 2054    insulin lispro (HUMALOG) injection vial 0-12 Units  0-12 Units Subcutaneous TID [facility] Norma Thorpe MD   4 Units at 02/27/21 0920    insulin lispro (HUMALOG) injection vial 0-6 Units  0-6 Units Subcutaneous Nightly Fercho Landis MD   2 Units at 02/26/21 2053    acetaminophen (TYLENOL) tablet 650 mg  650 mg Oral Q4H PRN Mingo J Holiday, DO        hydrALAZINE (APRESOLINE) injection 10 mg  10 mg Intravenous Q10 Min PRN Mingo J Holiday, DO        labetalol (NORMODYNE;TRANDATE) injection 10 mg  10 mg Intravenous Q30 Min PRN Mingo J Holiday, DO        ticagrelor (BRILINTA) tablet 90 mg  90 mg Oral BID Mingo J Holiday, DO   90 mg at 02/27/21 0221       ALLERGIES: Patient has no known allergies. OBJECTIVE:     VITALS:  Blood pressure (!) 155/94, pulse 90, temperature 98.6 °F (37 °C), resp. rate 16, height 5' 10.5\" (1.791 m), weight 252 lb 6.4 oz (114.5 kg), SpO2 96 %. Body mass index is 35.7 kg/m². Physical Exam   Constitutional: He is oriented to person, place, and time. He appears well-developed and well-nourished. HENT:   Head: Normocephalic and atraumatic. Eyes: Pupils are equal, round, and reactive to light. Neck: Normal range of motion. Neck supple. No JVD present. No tracheal deviation present. No thyromegaly present. Cardiovascular: Normal rate, regular rhythm, normal heart sounds and intact distal pulses. PMI is not displaced. Exam reveals no gallop, no S3, no distant heart sounds and no friction rub. No murmur heard. Pulmonary/Chest: No respiratory distress. He has no wheezes. He has no rales. He exhibits no tenderness. Abdominal: Soft. Bowel sounds are normal. He exhibits no distension and no mass. There is no abdominal tenderness. There is no rebound and no guarding. Musculoskeletal:         General: No edema. Neurological: He is alert and oriented to person, place, and time. No cranial nerve deficit. Skin: Skin is warm and dry. No rash noted. He is not diaphoretic. No erythema. No pallor. Psychiatric: He has a normal mood and affect.  His behavior is normal. Judgment and thought content normal.         LABS:  Recent Results (from the past 24 hour(s))   POCT Glucose    Collection Time: 02/26/21 11:02 AM   Result Value Ref Range    POC Glucose 354 (H) 60 - 115 mg/dl    Performed on ACCU-CHEK    Hemoglobin A1c    Collection Time: 02/26/21  1:37 PM   Result Value Ref Range    Hemoglobin A1C 9.3 (H) 4.8 - 5.9 %   POCT Arterial    Collection Time: 02/26/21  3:30 PM   Result Value Ref Range    ACT-K 224 (H) 82 - 152 sec    Sample Type ART     Performed on SEE BELOW    POCT Arterial    Collection Time: 02/26/21  3:53 PM   Result Value Ref Range    ACT-K 257 (H) 82 - 152 sec    Sample Type ART     Performed on SEE BELOW    POCT Glucose    Collection Time: 02/26/21  8:52 PM   Result Value Ref Range    POC Glucose 239 (H) 60 - 115 mg/dl    Performed on ACCU-CHEK    EKG 12 lead    Collection Time: 02/27/21  2:25 AM   Result Value Ref Range    Ventricular Rate 84 BPM    Atrial Rate 84 BPM    P-R Interval 186 ms    QRS Duration 152 ms    Q-T Interval 398 ms    QTc Calculation (Bazett) 470 ms    P Axis 72 degrees    R Axis 79 degrees    T Axis 30 degrees   CBC    Collection Time: 02/27/21  5:45 AM   Result Value Ref Range    WBC 8.3 4.8 - 10.8 K/uL    RBC 4.35 (L) 4.70 - 6.10 M/uL    Hemoglobin 13.8 (L) 14.0 - 18.0 g/dL    Hematocrit 39.4 (L) 42.0 - 52.0 %    MCV 90.7 80.0 - 100.0 fL    MCH 31.7 (H) 27.0 - 31.3 pg    MCHC 35.0 33.0 - 37.0 %    RDW 13.6 11.5 - 14.5 %    Platelets 269 184 - 499 K/uL   Basic Metabolic Panel    Collection Time: 02/27/21  5:46 AM   Result Value Ref Range    Sodium 133 (L) 135 - 144 mEq/L    Potassium 3.9 3.4 - 4.9 mEq/L    Chloride 99 95 - 107 mEq/L    CO2 23 20 - 31 mEq/L    Anion Gap 11 9 - 15 mEq/L    Glucose 191 (H) 70 - 99 mg/dL    BUN 9 6 - 20 mg/dL    CREATININE 0.69 (L) 0.70 - 1.20 mg/dL    GFR Non-African American >60.0 >60    GFR  >60.0 >60    Calcium 8.8 8.5 - 9.9 mg/dL   POCT Glucose    Collection Time: 02/27/21  6:02 AM   Result Value Ref Range    POC Glucose 205 (H) 60 - 115 mg/dl Performed on ACCU-CHEK      Troponin:    Lab Results   Component Value Date    TROPONINI 0.368 02/26/2021           ASSESSMENT:    Active Hospital Problems    Diagnosis Date Noted    NSTEMI (non-ST elevated myocardial infarction) [facility]) [I21.4] 02/26/2021     Priority: High     Non-ST elevation MI  CAD status post PCI of the mid RCA with drug-eluting stent  Diabetes  Alcohol abuse  Abnormal EKG  Former tobacco abuse  Family history of CAD    PLAN:   1. As always, aggressive risk factor modification is strongly recommended. We should adhere to the JNC VIII guidelines for HTN management and the NCEP ATP III guidelines for LDL-C management. 2. Continue with dual antiplatelet therapy  3. Maximize cardiac medications  4. Tobacco and alcohol cessation  5. Strict diabetes control  6. Stable for discharge home.       Electronically signed by Edith Patel DO on 2/27/2021 at 10:26 AM

## 2021-02-27 NOTE — FLOWSHEET NOTE
1900 - Report given by Hernan Cabrera and cath site assessed. Pt's cath site free from redness and drainage but does have some swelling. Site is soft. Radial pulse is present. Cath site checked multiple times during shift and unchanged. Pt's call light in reach.

## 2021-02-27 NOTE — DISCHARGE SUMMARY
Physician Discharge Summary     Patient ID:  Mayuri Hall  [id number]  29 y.o.  1975    Admit date: 2/26/2021    Discharge date : 02/27/21     Admitting Physician: Mila Martinez MD     Discharge Physician: Matt Tai MD     Admission Diagnoses: NSTEMI (non-ST elevated myocardial infarction) [facility]) [I21.4]    Discharge Diagnoses: NSTEMI s/p PCI PAVEL RCA, alcohol dependence, uncontrolled DM2    Admission Condition: fair    Discharged Condition: good    Hospital Course:   NSTEMI - taken to cath lab for elevated troponins associated with chest pain, noted to have 100% RCA occlusion, drug eluding stent placed, see full report. Patient will be dc'd on asa, statin, arb, brilinta    Alcohol dependence - discussed at length the need to seek sobriety. Patient and wife verbalized understanding. Recommend no benzodiazepines.       Consults: cardiology    Significant Diagnostic Studies: as below    Discharge Exam:  /84   Pulse 88   Temp 98.4 °F (36.9 °C) (Oral)   Resp 16   Ht 5' 10.5\" (1.791 m)   Wt 252 lb 6.4 oz (114.5 kg)   SpO2 95%   BMI 35.70 kg/m²   General appearance: alert, appears stated age and cooperative  Lungs: clear to auscultation bilaterally  Heart: regular rate and rhythm, S1, S2 normal, no murmur, click, rub or gallop  Abdomen: soft, non-tender; bowel sounds normal; no masses,  no organomegaly  Extremities: extremities normal, atraumatic, no cyanosis or edema  Skin: Skin color, texture, turgor normal. No rashes or lesions    Labs:   Recent Labs     02/26/21 0256 02/27/21  0545   WBC 8.3 8.3   HGB 15.1 13.8*   HCT 43.4 39.4*    144     Recent Labs     02/26/21  0256 02/27/21  0546    133*   K 4.4 3.9   CL 97 99   CO2 24 23   BUN 15 9   CREATININE 0.78 0.69*   CALCIUM 9.6 8.8     Recent Labs     02/26/21 0256   AST 40   ALT 55*   BILITOT 0.7   ALKPHOS 90     Recent Labs     02/26/21 0256   INR 0.9     Recent Labs     02/26/21  0256 02/26/21  0735 02/26/21  1010 CHANGED    Details   metFORMIN (GLUCOPHAGE) 500 MG tablet TAKE 1 TABLET BY MOUTH TWICE DAILY WITH MEALS, restart 2/28/2021  Qty: 180 tablet, Refills: 4         CONTINUE these medications which have NOT CHANGED    Details   sertraline (ZOLOFT) 100 MG tablet TAKE 1 TABLET DAILY (NEED APPOINTMENT FOR REFILLS)  Qty: 30 tablet, Refills: 0      omega-3 acid ethyl esters (LOVAZA) 1 g capsule TAKE 2 CAPSULES BY MOUTH TWICE DAILY  Qty: 120 capsule, Refills: 4    Associated Diagnoses: Other hyperlipidemia      glimepiride (AMARYL) 2 MG tablet TAKE 1 TABLET BY MOUTH TWICE DAILY BEFORE MEALS  Qty: 180 tablet, Refills: 4      dicyclomine (BENTYL) 20 MG tablet Take 1 tablet by mouth every 6 hours  Qty: 120 tablet, Refills: 4      Omeprazole Magnesium (PRILOSEC OTC PO) Take by mouth daily       Multiple Vitamin (ONE-A-DAY MENS PO) Take  by mouth. glucose blood VI test strips (ONETOUCH VERIO) strip 1 each by In Vitro route 2 times daily As needed. Qty: 100 each, Refills: 3      Lancets MISC Uses two daily  Qty: 100 each, Refills: 3         STOP taking these medications       diazePAM (VALIUM) 10 MG tablet Comments:   Reason for Stopping:         LORazepam (ATIVAN) 0.5 MG tablet Comments:   Reason for Stopping:             Activity: activity as tolerated  Diet: diabetic diet  Wound Care: as directed    Follow-up with PCP 1-2 weeks, Dr. Niles Baker 1 month.     DC time 35 minutes    Signed:  Electronically signed by Abdiel Lundy MD on 2/27/2021 at 8:33 AM

## 2021-03-01 PROCEDURE — 93010 ELECTROCARDIOGRAM REPORT: CPT | Performed by: INTERNAL MEDICINE

## 2021-03-30 ENCOUNTER — OFFICE VISIT (OUTPATIENT)
Dept: CARDIOLOGY CLINIC | Age: 46
End: 2021-03-30
Payer: COMMERCIAL

## 2021-03-30 VITALS
HEART RATE: 75 BPM | SYSTOLIC BLOOD PRESSURE: 130 MMHG | BODY MASS INDEX: 34.66 KG/M2 | OXYGEN SATURATION: 97 % | DIASTOLIC BLOOD PRESSURE: 80 MMHG | WEIGHT: 245 LBS

## 2021-03-30 DIAGNOSIS — E66.09 CLASS 1 OBESITY DUE TO EXCESS CALORIES WITHOUT SERIOUS COMORBIDITY WITH BODY MASS INDEX (BMI) OF 34.0 TO 34.9 IN ADULT: ICD-10-CM

## 2021-03-30 DIAGNOSIS — I25.2 HISTORY OF NON-ST ELEVATION MYOCARDIAL INFARCTION (NSTEMI): ICD-10-CM

## 2021-03-30 DIAGNOSIS — E78.49 OTHER HYPERLIPIDEMIA: Primary | ICD-10-CM

## 2021-03-30 DIAGNOSIS — Z87.891 HISTORY OF TOBACCO ABUSE: ICD-10-CM

## 2021-03-30 DIAGNOSIS — I25.10 CORONARY ARTERY DISEASE INVOLVING NATIVE CORONARY ARTERY OF NATIVE HEART WITHOUT ANGINA PECTORIS: ICD-10-CM

## 2021-03-30 PROCEDURE — G8484 FLU IMMUNIZE NO ADMIN: HCPCS | Performed by: INTERNAL MEDICINE

## 2021-03-30 PROCEDURE — G8427 DOCREV CUR MEDS BY ELIG CLIN: HCPCS | Performed by: INTERNAL MEDICINE

## 2021-03-30 PROCEDURE — 99214 OFFICE O/P EST MOD 30 MIN: CPT | Performed by: INTERNAL MEDICINE

## 2021-03-30 PROCEDURE — 1036F TOBACCO NON-USER: CPT | Performed by: INTERNAL MEDICINE

## 2021-03-30 PROCEDURE — G8417 CALC BMI ABV UP PARAM F/U: HCPCS | Performed by: INTERNAL MEDICINE

## 2021-03-30 RX ORDER — LOSARTAN POTASSIUM 25 MG/1
25 TABLET ORAL DAILY
COMMUNITY
End: 2021-04-01 | Stop reason: SDUPTHER

## 2021-03-30 RX ORDER — ASPIRIN 81 MG/1
81 TABLET, CHEWABLE ORAL DAILY
Qty: 30 TABLET | Refills: 11 | Status: SHIPPED | OUTPATIENT
Start: 2021-03-30 | End: 2021-04-01 | Stop reason: SDUPTHER

## 2021-03-30 RX ORDER — ATORVASTATIN CALCIUM 80 MG/1
80 TABLET, FILM COATED ORAL NIGHTLY
Qty: 30 TABLET | Refills: 5 | Status: SHIPPED | OUTPATIENT
Start: 2021-03-30 | End: 2021-04-01 | Stop reason: SDUPTHER

## 2021-03-30 NOTE — PROGRESS NOTES
[place] for further evaluation.     At time of evaluation today, patient seen on 1 W. resting comfortably and in no acute distress. He is complaining of persistent chest pain since admission which is somewhat alleviated with nitro and morphine albeit not resolved. Serial troponins have been elevated x2 at 0.326 and 0.335. He is hemodynamically stable. On telemetry he is sinus rhythm with heart rate in the 80s. He has no known prior cardiac history including history of myocardial infarction, congestive heart failure or arrhythmia. He denies any history of CVA or TIA. He states he had just seen his PCP regarding his chest pain complaints and was referred to cardiology, [facility]. He states he saw Dr. Yogi Higgins yesterday and was recommended to undergo outpatient stress test in a few weeks. He was offered to see [facility] during this admission but declined. 3/30/2021: Patient presents for initial medical evaluation. Patient is followed on a regular basis by Dr. Phoenix Roy MD.  Patient is status post hospitalization for chest pain. Underwent cardiac catheterization noted to have 100% RCA stenosis status post drug-eluting stents. Noted to have 30% mid circumflex, mild LAD disease, normal left main and ejection fraction of 60%. Status post echo with ejection fraction of 36%, grade 1 diastolic dysfunction no valve abnormalities. Pt denies chest pain, dyspnea, dyspnea on exertion, change in exercise capacity, fatigue,  nausea, vomiting, diarrhea, constipation, motor weakness, insomnia, weight loss, syncope, dizziness, lightheadedness, palpitations, PND, orthopnea, or claudication. No nitro use. BP and hr are good. CAD is stable. No LE discoloration or ulcers. No LE edema. No CHF type symptoms. Lipid profile is abnormal.  No more smoking. Hemoglobin A1c is 9.3.           Patient Active Problem List   Diagnosis    GERD (gastroesophageal reflux disease)    Alcohol abuse    Diverticulitis    Depression    Anxiety    Obesity    Type 2 diabetes mellitus without complication, without long-term current use of insulin (HCC)    Other hyperlipidemia    NSTEMI (non-ST elevated myocardial infarction) ([facility] [address]    Coronary artery disease involving native coronary artery of native heart without angina pectoris    History of tobacco abuse    History of non-ST elevation myocardial infarction (NSTEMI)       Past Surgical History:   Procedure Laterality Date    COLONOSCOPY  09    DR CROSS    HERNIA REPAIR      Surgical hernia    REVISION COLOSTOMY  13    Exp lap, lysis adhesions, takedown transverse colostomy, creation of colo-colo anstomosis, diverting loop ileostomy     SIGMOIDOSCOPY  13    Loop ileostomy closure [facility]     UPPER GASTROINTESTINAL ENDOSCOPY  09    DR CROSS       Social History     Socioeconomic History    Marital status:      Spouse name: Not on file    Number of children: Not on file    Years of education: Not on file    Highest education level: Not on file   Occupational History     Employer: [employer]   Social Needs    Financial resource strain: Not on file    Food insecurity     Worry: Not on file     Inability: Not on file    Transportation needs     Medical: Not on file     Non-medical: Not on file   Tobacco Use    Smoking status: Former Smoker     Packs/day: 1.00     Years: 15.00     Pack years: 15.00     Quit date:      Years since quittin.2    Smokeless tobacco: Never Used   Substance and Sexual Activity    Alcohol use:  Yes     Alcohol/week: 18.0 standard drinks     Types: 18 Cans of beer per week     Comment: drinks 18 beers 3-4 times per week    Drug use: Never    Sexual activity: Not on file   Lifestyle    Physical activity     Days per week: Not on file     Minutes per session: Not on file    Stress: Not on file   Relationships    Social connections     Talks on phone: Not on file     Gets together: Not on file     Attends Rastafarian service: Not on file     Active member of club or organization: Not on file     Attends meetings of clubs or organizations: Not on file     Relationship status: Not on file    Intimate partner violence     Fear of current or ex partner: Not on file     Emotionally abused: Not on file     Physically abused: Not on file     Forced sexual activity: Not on file   Other Topics Concern    Not on file   Social History Narrative    Not on file       Family History   Problem Relation Age of Onset    High Blood Pressure Mother     Coronary Art Dis Maternal Grandfather         passed from MI at age 48       Current Outpatient Medications   Medication Sig Dispense Refill    losartan (COZAAR) 25 MG tablet Take 25 mg by mouth daily      atorvastatin (LIPITOR) 80 MG tablet Take 1 tablet by mouth nightly 30 tablet 5    ticagrelor (BRILINTA) 90 MG TABS tablet Take 1 tablet by mouth 2 times daily 60 tablet 5    aspirin 81 MG chewable tablet Take 1 tablet by mouth daily 30 tablet 11    metoprolol tartrate (LOPRESSOR) 25 MG tablet Take 1 tablet by mouth 2 times daily 60 tablet 5    metFORMIN (GLUCOPHAGE) 500 MG tablet TAKE 1 TABLET BY MOUTH TWICE DAILY WITH MEALS, restart 2/28/2021 180 tablet 4    nitroGLYCERIN (NITROSTAT) 0.4 MG SL tablet up to max of 3 total doses. If no relief after 1 dose, call 911. 25 tablet 3    sertraline (ZOLOFT) 100 MG tablet TAKE 1 TABLET DAILY (NEED APPOINTMENT FOR REFILLS) 30 tablet 0    omega-3 acid ethyl esters (LOVAZA) 1 g capsule TAKE 2 CAPSULES BY MOUTH TWICE DAILY 120 capsule 4    glimepiride (AMARYL) 2 MG tablet TAKE 1 TABLET BY MOUTH TWICE DAILY BEFORE MEALS 180 tablet 4    dicyclomine (BENTYL) 20 MG tablet Take 1 tablet by mouth every 6 hours 120 tablet 4    glucose blood VI test strips (ONETOUCH VERIO) strip 1 each by In Vitro route 2 times daily As needed.  100 each 3    Lancets MISC Uses two daily 100 each 3    Omeprazole Magnesium (PRILOSEC OTC PO) Take by mouth daily       Multiple Vitamin (ONE-A-DAY MENS PO) Take  by mouth. No current facility-administered medications for this visit. Patient has no known allergies. Review of Systems:  General ROS: negative  Psychological ROS: negative  Hematological and Lymphatic ROS: No history of blood clots or bleeding disorder. Respiratory ROS: no cough, shortness of breath, or wheezing  Cardiovascular ROS: no chest pain or dyspnea on exertion  Gastrointestinal ROS: no abdominal pain, change in bowel habits, or black or bloody stools  Genito-Urinary ROS: no dysuria, trouble voiding, or hematuria  Musculoskeletal ROS: negative  Neurological ROS: no TIA or stroke symptoms  Dermatological ROS: negative    VITALS:  Blood pressure 130/80, pulse 75, weight 245 lb (111.1 kg), SpO2 97 %. Body mass index is 34.66 kg/m². Physical Examination:  General appearance - alert, well appearing, and in no distress  Mental status - alert, oriented to person, place, and time  Neck - Neck is supple, no JVD or carotid bruits. No thyromegaly or adenopathy. Chest - clear to auscultation, no wheezes, rales or rhonchi, symmetric air entry  Heart - normal rate, regular rhythm, normal S1, S2, no murmurs, rubs, clicks or gallops  Abdomen - soft, nontender, nondistended, no masses or organomegaly  Neurological - alert, oriented, normal speech, no focal findings or movement disorder noted  Extremities - peripheral pulses normal, no pedal edema, no clubbing or cyanosis  Skin - normal coloration and turgor, no rashes, no suspicious skin lesions noted      EKG:     No orders of the defined types were placed in this encounter. ASSESSMENT:     Diagnosis Orders   1. Other hyperlipidemia     2. Class 1 obesity due to excess calories without serious comorbidity with body mass index (BMI) of 34.0 to 34.9 in adult     3.  Coronary artery disease involving native coronary artery of native heart without angina pectoris     4. History of tobacco abuse     5. History of non-ST elevation myocardial infarction (NSTEMI)           PLAN:       As always, aggressive risk factor modification is strongly recommended. We should adhere to the JNC VIII guidelines for HTN management and the NCEP ATP III guidelines for LDL-C management. Cardiac diet is always recommended with low fat, cholesterol, calories and sodium. Continue medications at current doses. Patient was advised and encouraged to check blood pressure at home or at a pharmacy, maintain a logbook, and also call us back if blood pressure are above the target ranges or if it is low. Patient clearly understands and agrees to the instructions. We will need to continue to monitor muscle and liver enzymes, BUN, CR, and electrolytes. Cont with DAPT for one year    Details of medical condition explained and patient was warned about adverse consequences of uncontrolled medical conditions and possible side effects of prescribed medications. The proper use and anticipated side effects of nitroglycerine has been carefully explained. If a single episode of chest pain is not relieved by one tablet, the patient will try another within 5 minutes; and if this doesn't relieve the pain, the patient is instructed to call 911 for transportation to an emergency department. Patient was instructed not to shovel snow.

## 2021-04-01 DIAGNOSIS — E78.49 OTHER HYPERLIPIDEMIA: Primary | ICD-10-CM

## 2021-04-01 DIAGNOSIS — I25.10 CORONARY ARTERY DISEASE INVOLVING NATIVE CORONARY ARTERY OF NATIVE HEART WITHOUT ANGINA PECTORIS: ICD-10-CM

## 2021-04-01 RX ORDER — ATORVASTATIN CALCIUM 80 MG/1
80 TABLET, FILM COATED ORAL NIGHTLY
Qty: 90 TABLET | Refills: 2 | Status: SHIPPED | OUTPATIENT
Start: 2021-04-01

## 2021-04-01 RX ORDER — ASPIRIN 81 MG/1
81 TABLET, CHEWABLE ORAL DAILY
Qty: 90 TABLET | Refills: 2 | Status: SHIPPED | OUTPATIENT
Start: 2021-04-01

## 2021-04-01 RX ORDER — LOSARTAN POTASSIUM 25 MG/1
25 TABLET ORAL DAILY
Qty: 90 TABLET | Refills: 2 | Status: SHIPPED | OUTPATIENT
Start: 2021-04-01

## 2021-04-01 NOTE — TELEPHONE ENCOUNTER
requesting medication refill.  Please approve or deny this request.    Rx requested:  Requested Prescriptions     Pending Prescriptions Disp Refills    losartan (COZAAR) 25 MG tablet 90 tablet 2     Sig: Take 1 tablet by mouth daily    atorvastatin (LIPITOR) 80 MG tablet 90 tablet 2     Sig: Take 1 tablet by mouth nightly    ticagrelor (BRILINTA) 90 MG TABS tablet 180 tablet 2     Sig: Take 1 tablet by mouth 2 times daily    aspirin 81 MG chewable tablet 90 tablet 2     Sig: Take 1 tablet by mouth daily    metoprolol tartrate (LOPRESSOR) 25 MG tablet 180 tablet 2     Sig: Take 1 tablet by mouth 2 times daily         Last Office Visit:   3/30/2021      Next Visit Date:  Future Appointments   Date Time Provider Carmen Denis   9/30/2021  2:45 PM DO Isabel Willson

## 2021-04-06 ENCOUNTER — TELEPHONE (OUTPATIENT)
Dept: CARDIOLOGY CLINIC | Age: 46
End: 2021-04-06

## 2021-09-30 ENCOUNTER — OFFICE VISIT (OUTPATIENT)
Dept: CARDIOLOGY CLINIC | Age: 46
End: 2021-09-30

## 2021-09-30 VITALS
DIASTOLIC BLOOD PRESSURE: 80 MMHG | SYSTOLIC BLOOD PRESSURE: 130 MMHG | OXYGEN SATURATION: 97 % | BODY MASS INDEX: 34.8 KG/M2 | HEART RATE: 69 BPM | WEIGHT: 246 LBS | TEMPERATURE: 97.4 F

## 2021-09-30 DIAGNOSIS — E66.09 CLASS 1 OBESITY DUE TO EXCESS CALORIES WITHOUT SERIOUS COMORBIDITY WITH BODY MASS INDEX (BMI) OF 34.0 TO 34.9 IN ADULT: ICD-10-CM

## 2021-09-30 DIAGNOSIS — I25.2 HISTORY OF NON-ST ELEVATION MYOCARDIAL INFARCTION (NSTEMI): ICD-10-CM

## 2021-09-30 DIAGNOSIS — I25.10 CORONARY ARTERY DISEASE INVOLVING NATIVE CORONARY ARTERY OF NATIVE HEART WITHOUT ANGINA PECTORIS: ICD-10-CM

## 2021-09-30 DIAGNOSIS — Z87.891 HISTORY OF TOBACCO ABUSE: ICD-10-CM

## 2021-09-30 DIAGNOSIS — E78.49 OTHER HYPERLIPIDEMIA: Primary | ICD-10-CM

## 2021-09-30 PROCEDURE — 99214 OFFICE O/P EST MOD 30 MIN: CPT | Performed by: INTERNAL MEDICINE

## 2021-09-30 NOTE — PROGRESS NOTES
Chief Complaint   Patient presents with    Coronary Artery Disease    6 Month Follow-Up     2/26/2021: This is a very pleasant 23-year-old  male with past medical history significant for diabetes, former tobacco abuse and alcohol abuse who presented to the emergency room yesterday with chief complaint of chest pain. Around the beginning of the month, patient states that he had decided he did not want to take his diabetic medications anymore and began having issues with hyperglycemia. Shortly thereafter, he began experiencing intermittent episodes of midsternal to left-sided chest pain which he described as pressure and heaviness and also a burning discomfort. These episodes of chest pain would occur sporadically but within the past week he noticed more episodes of chest pain with exertion at work. 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 This was associated with shortness of breath. He denied nausea, vomiting, diaphoresis, palpitations, paroxysmal nocturnal dyspnea, orthopnea, lower extremity edema, syncope, fever or chills. Due to this recurrent and now persistent pain he presented to ER for further evaluation.     Patient was hemodynamically stable on presentation to ER. Sodium 136, potassium 4.4, chloride 97, total CO2 24, BUN 15, creatinine 0.78, GFR greater than 60, glucose elevated 236. Troponin elevated 0.326. Hernandez level negative. WBC 8.3, hemoglobin 15.1, hematocrit 43.4, platelets 399. Rapid Covid testing negative. Chest x-ray revealed no radiographic evidence of acute intrathoracic process. EKG showed sinus rhythm with ventricular rate 95 bpm, right bundle branch block, ST depression with T wave inversion in V1 through V3 as well as lead III,  ms. He was treated with 2 sublingual nitroglycerin and 4 mg of morphine in the ER and chest pain improved.   He was transferred to [facility] for further evaluation.     At time of evaluation today, patient seen on 1 W. resting comfortably and in no acute distress. He is complaining of persistent chest pain since admission which is somewhat alleviated with nitro and morphine albeit not resolved. Serial troponins have been elevated x2 at 0.326 and 0.335. He is hemodynamically stable. On telemetry he is sinus rhythm with heart rate in the 80s. He has no known prior cardiac history including history of myocardial infarction, congestive heart failure or arrhythmia. He denies any history of CVA or TIA. He states he had just seen his PCP regarding his chest pain complaints and was referred to cardiology, [facility]. He states he saw Dr. Candi Wang yesterday and was recommended to undergo outpatient stress test in a few weeks. He was offered to see [facility] during this admission but declined. 3/30/2021: Patient presents for initial medical evaluation. Patient is followed on a regular basis by Dr. Ganga Ramos MD.  Patient is status post hospitalization for chest pain. Underwent cardiac catheterization noted to have 100% RCA stenosis status post drug-eluting stents. Noted to have 30% mid circumflex, mild LAD disease, normal left main and ejection fraction of 60%. Status post echo with ejection fraction of 99%, grade 1 diastolic dysfunction no valve abnormalities. Pt denies chest pain, dyspnea, dyspnea on exertion, change in exercise capacity, fatigue,  nausea, vomiting, diarrhea, constipation, motor weakness, insomnia, weight loss, syncope, dizziness, lightheadedness, palpitations, PND, orthopnea, or claudication. No nitro use. BP and hr are good. CAD is stable. No LE discoloration or ulcers. No LE edema. No CHF type symptoms. Lipid profile is abnormal.  No more smoking.   Hemoglobin A1c is 9.3.    9-30-21: Pt denies chest pain, dyspnea, dyspnea on exertion, change in exercise capacity, fatigue,  nausea, vomiting, diarrhea, constipation, motor weakness, insomnia, weight loss, syncope, dizziness, lightheadedness, palpitations, PND, orthopnea, or claudication. No nitro use. BP and hr are good. CAD is stable. No LE discoloration or ulcers. No LE edema. No CHF type symptoms. Lipid profile is normal. No recent hospitalization. No change in meds. Hx of CAD s/p PCI. Normal LVF. Patient Active Problem List   Diagnosis    GERD (gastroesophageal reflux disease)    Alcohol abuse    Diverticulitis    Depression    Anxiety    Obesity    Type 2 diabetes mellitus without complication, without long-term current use of insulin (HCC)    Other hyperlipidemia    NSTEMI (non-ST elevated myocardial infarction) ([facility] [address]    Coronary artery disease involving native coronary artery of native heart without angina pectoris    History of tobacco abuse    History of non-ST elevation myocardial infarction (NSTEMI)       Past Surgical History:   Procedure Laterality Date    COLONOSCOPY  09    DR CROSS    HERNIA REPAIR      Surgical hernia    REVISION COLOSTOMY  13    Exp lap, lysis adhesions, takedown transverse colostomy, creation of colo-colo anstomosis, diverting loop ileostomy     SIGMOIDOSCOPY  13    Loop ileostomy closure [facility]     UPPER GASTROINTESTINAL ENDOSCOPY  09    DR CROSS       Social History     Socioeconomic History    Marital status:      Spouse name: Not on file    Number of children: Not on file    Years of education: Not on file    Highest education level: Not on file   Occupational History     Employer: [employer]   Tobacco Use    Smoking status: Former Smoker     Packs/day: 1.00     Years: 15.00     Pack years: 15.00     Quit date:      Years since quittin.7    Smokeless tobacco: Never Used   Substance and Sexual Activity    Alcohol use:  Yes     Alcohol/week: 18.0 standard drinks     Types: 18 Cans of beer per week     Comment: drinks 18 beers 3-4 times per week    Drug use: Never    Sexual activity: Not on file   Other Topics Concern    Not on file   Social History Narrative    Not on file     Social Determinants of Health     Financial Resource Strain:     Difficulty of Paying Living Expenses:    Food Insecurity:     Worried About Running Out of Food in the Last Year:     [address] in the Last Year:    Transportation Needs:     Lack of Transportation (Medical):  Lack of Transportation (Non-Medical):    Physical Activity:     Days of Exercise per Week:     Minutes of Exercise per Session:    Stress:     Feeling of Stress :    Social Connections:     Frequency of Communication with Friends and Family:     Frequency of Social Gatherings with Friends and Family:     Attends [employer] Services:     Active Member of Clubs or Organizations:     Attends Club or Organization Meetings:     Marital Status:    Intimate Partner Violence:     Fear of Current or Ex-Partner:     Emotionally Abused:     Physically Abused:     Sexually Abused:        Family History   Problem Relation Age of Onset    High Blood Pressure Mother     Coronary Art Dis Maternal Grandfather         passed from MI at age 48       Current Outpatient Medications   Medication Sig Dispense Refill    losartan (COZAAR) 25 MG tablet Take 1 tablet by mouth daily 90 tablet 2    atorvastatin (LIPITOR) 80 MG tablet Take 1 tablet by mouth nightly 90 tablet 2    ticagrelor (BRILINTA) 90 MG TABS tablet Take 1 tablet by mouth 2 times daily 180 tablet 2    aspirin 81 MG chewable tablet Take 1 tablet by mouth daily 90 tablet 2    metoprolol tartrate (LOPRESSOR) 25 MG tablet Take 1 tablet by mouth 2 times daily 180 tablet 2    metFORMIN (GLUCOPHAGE) 500 MG tablet TAKE 1 TABLET BY MOUTH TWICE DAILY WITH MEALS, restart 2/28/2021 180 tablet 4    nitroGLYCERIN (NITROSTAT) 0.4 MG SL tablet up to max of 3 total doses.  If no relief after 1 dose, call 911. 25 tablet organomegaly  Neurological - alert, oriented, normal speech, no focal findings or movement disorder noted  Extremities - peripheral pulses normal, no pedal edema, no clubbing or cyanosis  Skin - normal coloration and turgor, no rashes, no suspicious skin lesions noted      EKG:     No orders of the defined types were placed in this encounter. ASSESSMENT:     Diagnosis Orders   1. Other hyperlipidemia     2. History of tobacco abuse     3. History of non-ST elevation myocardial infarction (NSTEMI)     4. Coronary artery disease involving native coronary artery of native heart without angina pectoris     5. Class 1 obesity due to excess calories without serious comorbidity with body mass index (BMI) of 34.0 to 34.9 in adult           PLAN:       As always, aggressive risk factor modification is strongly recommended. We should adhere to the JNC VIII guidelines for HTN management and the NCEP ATP III guidelines for LDL-C management. Cardiac diet is always recommended with low fat, cholesterol, calories and sodium. Continue medications at current doses. Patient was advised and encouraged to check blood pressure at home or at a pharmacy, maintain a logbook, and also call us back if blood pressure are above the target ranges or if it is low. Patient clearly understands and agrees to the instructions. We will need to continue to monitor muscle and liver enzymes, BUN, CR, and electrolytes. Cont with DAPT for one year    Details of medical condition explained and patient was warned about adverse consequences of uncontrolled medical conditions and possible side effects of prescribed medications. The proper use and anticipated side effects of nitroglycerine has been carefully explained.   If a single episode of chest pain is not relieved by one tablet, the patient will try another within 5 minutes; and if this doesn't relieve the pain, the patient is instructed to call 911 for transportation to an emergency department. Patient was instructed not to shovel snow.

## 2021-10-06 ENCOUNTER — HOSPITAL ENCOUNTER (OUTPATIENT)
Dept: LAB | Age: 46
Discharge: HOME | End: 2021-10-06
Payer: COMMERCIAL

## 2021-10-06 DIAGNOSIS — E78.49 OTHER HYPERLIPIDEMIA: ICD-10-CM

## 2021-10-06 LAB
ALBUMIN SERPL-MCNC: 5 G/DL (ref 3.5–4.6)
ALP BLD-CCNC: 78 U/L (ref 35–104)
ALT SERPL-CCNC: 29 U/L (ref 0–41)
ANION GAP SERPL CALCULATED.3IONS-SCNC: 15 MEQ/L (ref 9–15)
AST SERPL-CCNC: 24 U/L (ref 0–40)
BILIRUB SERPL-MCNC: 0.8 MG/DL (ref 0.2–0.7)
BUN BLDV-MCNC: 20 MG/DL (ref 6–20)
CALCIUM SERPL-MCNC: 9.6 MG/DL (ref 8.5–9.9)
CHLORIDE BLD-SCNC: 104 MEQ/L (ref 95–107)
CHOLESTEROL, TOTAL: 116 MG/DL (ref 0–199)
CO2: 23 MEQ/L (ref 20–31)
CREAT SERPL-MCNC: 0.82 MG/DL (ref 0.7–1.2)
GFR AFRICAN AMERICAN: >60
GFR NON-AFRICAN AMERICAN: >60
GLOBULIN: 2.4 G/DL (ref 2.3–3.5)
GLUCOSE BLD-MCNC: 114 MG/DL (ref 70–99)
HCT VFR BLD CALC: 40.7 % (ref 42–52)
HDLC SERPL-MCNC: 45 MG/DL (ref 40–59)
HEMOGLOBIN: 14.5 G/DL (ref 14–18)
LDL CHOLESTEROL CALCULATED: 40 MG/DL (ref 0–129)
MCH RBC QN AUTO: 32.5 PG (ref 27–31.3)
MCHC RBC AUTO-ENTMCNC: 35.7 % (ref 33–37)
MCV RBC AUTO: 91.1 FL (ref 80–100)
PDW BLD-RTO: 13.8 % (ref 11.5–14.5)
PLATELET # BLD: 181 K/UL (ref 130–400)
POTASSIUM SERPL-SCNC: 4.3 MEQ/L (ref 3.4–4.9)
RBC # BLD: 4.47 M/UL (ref 4.7–6.1)
SODIUM BLD-SCNC: 142 MEQ/L (ref 135–144)
TOTAL PROTEIN: 7.4 G/DL (ref 6.3–8)
TRIGL SERPL-MCNC: 154 MG/DL (ref 0–150)
TSH SERPL DL<=0.05 MIU/L-ACNC: 1.75 UIU/ML (ref 0.44–3.86)
WBC # BLD: 8.6 K/UL (ref 4.8–10.8)

## 2021-10-06 PROCEDURE — 80053 COMPREHEN METABOLIC PANEL: CPT

## 2021-10-06 PROCEDURE — 84443 ASSAY THYROID STIM HORMONE: CPT

## 2021-10-06 PROCEDURE — 80061 LIPID PANEL: CPT

## 2021-10-06 PROCEDURE — 85027 COMPLETE CBC AUTOMATED: CPT

## 2021-10-06 PROCEDURE — 36415 COLL VENOUS BLD VENIPUNCTURE: CPT

## 2022-03-07 DIAGNOSIS — I25.10 CORONARY ARTERY DISEASE INVOLVING NATIVE CORONARY ARTERY OF NATIVE HEART WITHOUT ANGINA PECTORIS: ICD-10-CM

## 2022-03-07 RX ORDER — TICAGRELOR 90 MG/1
TABLET ORAL
Qty: 60 TABLET | Refills: 5 | Status: SHIPPED | OUTPATIENT
Start: 2022-03-07

## 2022-03-07 NOTE — TELEPHONE ENCOUNTER
Requesting medication refill. Please approve or deny this request.    Rx requested:  Requested Prescriptions     Pending Prescriptions Disp Refills    BRILINTA 90 MG TABS tablet [Pharmacy Med Name: Yoel Gregory 90 mg tablet] 60 tablet 5     Sig: TAKE ONE TABLET BY MOUTH TWICE DAILY         Last Office Visit:   9/30/2021      Next Visit Date:  Future Appointments   Date Time Provider Carmen Denis   5/26/2022  3:00 PM Mingo Barillas, DO [facility]               Last refill 4/1/21. Please approve or deny.

## 2022-05-26 ENCOUNTER — OFFICE VISIT (OUTPATIENT)
Dept: CARDIOLOGY CLINIC | Age: 47
End: 2022-05-26
Payer: COMMERCIAL

## 2022-05-26 VITALS
DIASTOLIC BLOOD PRESSURE: 86 MMHG | TEMPERATURE: 97.9 F | SYSTOLIC BLOOD PRESSURE: 128 MMHG | BODY MASS INDEX: 36.78 KG/M2 | HEART RATE: 75 BPM | WEIGHT: 260 LBS

## 2022-05-26 DIAGNOSIS — Z87.891 HISTORY OF TOBACCO ABUSE: ICD-10-CM

## 2022-05-26 DIAGNOSIS — E78.49 OTHER HYPERLIPIDEMIA: ICD-10-CM

## 2022-05-26 DIAGNOSIS — I25.2 HISTORY OF NON-ST ELEVATION MYOCARDIAL INFARCTION (NSTEMI): ICD-10-CM

## 2022-05-26 DIAGNOSIS — I25.10 CORONARY ARTERY DISEASE INVOLVING NATIVE CORONARY ARTERY OF NATIVE HEART WITHOUT ANGINA PECTORIS: Primary | ICD-10-CM

## 2022-05-26 DIAGNOSIS — E66.09 CLASS 1 OBESITY DUE TO EXCESS CALORIES WITHOUT SERIOUS COMORBIDITY WITH BODY MASS INDEX (BMI) OF 34.0 TO 34.9 IN ADULT: ICD-10-CM

## 2022-05-26 PROCEDURE — 93000 ELECTROCARDIOGRAM COMPLETE: CPT | Performed by: INTERNAL MEDICINE

## 2022-05-26 PROCEDURE — 99214 OFFICE O/P EST MOD 30 MIN: CPT | Performed by: INTERNAL MEDICINE

## 2022-05-26 NOTE — PROGRESS NOTES
Chief Complaint   Patient presents with    Coronary Artery Disease    Hypertension     2/26/2021: This is a very pleasant 78-year-old  male with past medical history significant for diabetes, former tobacco abuse and alcohol abuse who presented to the emergency room yesterday with chief complaint of chest pain. Around the beginning of the month, patient states that he had decided he did not want to take his diabetic medications anymore and began having issues with hyperglycemia. Shortly thereafter, he began experiencing intermittent episodes of midsternal to left-sided chest pain which he described as pressure and heaviness and also a burning discomfort. These episodes of chest pain would occur sporadically but within the past week he noticed more episodes of chest pain with exertion at work. 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 This was associated with shortness of breath. He denied nausea, vomiting, diaphoresis, palpitations, paroxysmal nocturnal dyspnea, orthopnea, lower extremity edema, syncope, fever or chills. Due to this recurrent and now persistent pain he presented to ER for further evaluation.     Patient was hemodynamically stable on presentation to ER. Sodium 136, potassium 4.4, chloride 97, total CO2 24, BUN 15, creatinine 0.78, GFR greater than 60, glucose elevated 236. Troponin elevated 0.326. Hernandez level negative. WBC 8.3, hemoglobin 15.1, hematocrit 43.4, platelets 468. Rapid Covid testing negative. Chest x-ray revealed no radiographic evidence of acute intrathoracic process. EKG showed sinus rhythm with ventricular rate 95 bpm, right bundle branch block, ST depression with T wave inversion in V1 through V3 as well as lead III,  ms. He was treated with 2 sublingual nitroglycerin and 4 mg of morphine in the ER and chest pain improved.   [facility].     At time of evaluation today, patient seen on 1 W. resting comfortably and in no acute distress. He is complaining of persistent chest pain since admission which is somewhat alleviated with nitro and morphine albeit not resolved. Serial troponins have been elevated x2 at 0.326 and 0.335. He is hemodynamically stable. On telemetry he is sinus rhythm with heart rate in the 80s. He has no known prior cardiac history including history of myocardial infarction, congestive heart failure or arrhythmia. He denies any history of CVA or TIA. He states he had just seen his PCP regarding his chest pain complaints and was referred to cardiology, [facility] and [facility]. He states he saw Dr. Enrico Buitrago yesterday and was recommended to undergo outpatient stress test in a few weeks. He was offered to see [facility] during this admission but declined. 3/30/2021: Patient presents for initial medical evaluation. Patient is followed on a regular basis by Dr. Tash Lawson MD.  Patient is status post hospitalization for chest pain. Underwent cardiac catheterization noted to have 100% RCA stenosis status post drug-eluting stents. Noted to have 30% mid circumflex, mild LAD disease, normal left main and ejection fraction of 60%. Status post echo with ejection fraction of 17%, grade 1 diastolic dysfunction no valve abnormalities. Pt denies chest pain, dyspnea, dyspnea on exertion, change in exercise capacity, fatigue,  nausea, vomiting, diarrhea, constipation, motor weakness, insomnia, weight loss, syncope, dizziness, lightheadedness, palpitations, PND, orthopnea, or claudication. No nitro use. BP and hr are good. CAD is stable. No LE discoloration or ulcers. No LE edema. No CHF type symptoms. Lipid profile is abnormal.  No more smoking.   Hemoglobin A1c is 9.3.    9-30-21: Pt denies chest pain, dyspnea, dyspnea on exertion, change in exercise capacity, fatigue, nausea, vomiting, diarrhea, constipation, motor weakness, insomnia, weight loss, syncope, dizziness, lightheadedness, palpitations, PND, orthopnea, or claudication. No nitro use. BP and hr are good. CAD is stable. No LE discoloration or ulcers. No LE edema. No CHF type symptoms. Lipid profile is normal. No recent hospitalization. No change in meds. Hx of CAD s/p PCI. Normal LVF.    5-26-22: Pt denies chest pain, dyspnea, dyspnea on exertion, change in exercise capacity, fatigue,  nausea, vomiting, diarrhea, constipation, motor weakness, insomnia, weight loss, syncope, dizziness, lightheadedness, palpitations, PND, orthopnea, or claudication. Continues to drink beer about 60 cans a week. No hard liquor. No smoking. Liver enzymes mildly elevated. Hx of CAD s/p PCI, hx of normal LVF. EKG with RBBB.        Patient Active Problem List   Diagnosis    GERD (gastroesophageal reflux disease)    Alcohol abuse    Diverticulitis    Depression    Anxiety    Obesity    Type 2 diabetes mellitus without complication, without long-term current use of insulin (HCC)    Other hyperlipidemia    NSTEMI (non-ST elevated myocardial infarction) ([facility] [address]    Coronary artery disease involving native coronary artery of native heart without angina pectoris    History of tobacco abuse    History of non-ST elevation myocardial infarction (NSTEMI)       Past Surgical History:   Procedure Laterality Date    COLONOSCOPY  5/11/09    DR CROSS    HERNIA REPAIR      Surgical hernia    REVISION COLOSTOMY  04/01/13    Exp lap, lysis adhesions, takedown transverse colostomy, creation of colo-colo anstomosis, diverting loop ileostomy     SIGMOIDOSCOPY  7/5/13    Loop ileostomy closure [facility]     UPPER GASTROINTESTINAL ENDOSCOPY  5/11/09    DR CROSS       Social History     Socioeconomic History    Marital status:      Spouse name: Not on file    Number of children: Not on file    Years of education: Not on file   Boris Hernandez Highest education level: Not on file   Occupational History     Employer: [employer]   Tobacco Use    Smoking status: Former Smoker     Packs/day: 1.00     Years: 15.00     Pack years: 15.00     Quit date:      Years since quittin.4    Smokeless tobacco: Never Used   Substance and Sexual Activity    Alcohol use: Yes     Alcohol/week: 18.0 standard drinks     Types: 18 Cans of beer per week     Comment: drinks 18 beers 3-4 times per week    Drug use: Never    Sexual activity: Not on file   Other Topics Concern    Not on file   Social History Narrative    Not on file     Social Determinants of Health     Financial Resource Strain:     Difficulty of Paying Living Expenses: Not on file   Food Insecurity:     Worried About Running Out of Food in the Last Year: Not on file    Shawna of Food in the Last Year: Not on file   Transportation Needs:     Lack of Transportation (Medical): Not on file    Lack of Transportation (Non-Medical):  Not on file   Physical Activity:     Days of Exercise per Week: Not on file    Minutes of Exercise per Session: Not on file   Stress:     Feeling of Stress : Not on file   Social Connections:     Frequency of Communication with Friends and Family: Not on file    Frequency of Social Gatherings with Friends and Family: Not on file    Attends Gnosticist Services: Not on file    Active Member of [address] [place] [address] or Organizations: Not on file    Attends Club or Organization Meetings: Not on file    Marital Status: Not on file   Intimate Partner Violence:     Fear of Current or Ex-Partner: Not on file    Emotionally Abused: Not on file    Physically Abused: Not on file    Sexually Abused: Not on file   Housing Stability:     Unable to Pay for Housing in the Last Year: Not on file    Number of [place] in the Last Year: Not on file    Unstable Housing in the Last Year: Not on file       Family History   Problem Relation Age of Onset    High Blood Pressure Mother  Coronary Art Dis Maternal Grandfather         passed from MI at age 48       Current Outpatient Medications   Medication Sig Dispense Refill    BRILINTA 90 MG TABS tablet TAKE ONE TABLET BY MOUTH TWICE DAILY 60 tablet 5    losartan (COZAAR) 25 MG tablet Take 1 tablet by mouth daily 90 tablet 2    atorvastatin (LIPITOR) 80 MG tablet Take 1 tablet by mouth nightly 90 tablet 2    aspirin 81 MG chewable tablet Take 1 tablet by mouth daily 90 tablet 2    metoprolol tartrate (LOPRESSOR) 25 MG tablet Take 1 tablet by mouth 2 times daily 180 tablet 2    metFORMIN (GLUCOPHAGE) 500 MG tablet TAKE 1 TABLET BY MOUTH TWICE DAILY WITH MEALS, restart 2/28/2021 180 tablet 4    nitroGLYCERIN (NITROSTAT) 0.4 MG SL tablet up to max of 3 total doses. If no relief after 1 dose, call 911. 25 tablet 3    sertraline (ZOLOFT) 100 MG tablet TAKE 1 TABLET DAILY (NEED APPOINTMENT FOR REFILLS) 30 tablet 0    omega-3 acid ethyl esters (LOVAZA) 1 g capsule TAKE 2 CAPSULES BY MOUTH TWICE DAILY 120 capsule 4    glimepiride (AMARYL) 2 MG tablet TAKE 1 TABLET BY MOUTH TWICE DAILY BEFORE MEALS 180 tablet 4    dicyclomine (BENTYL) 20 MG tablet Take 1 tablet by mouth every 6 hours 120 tablet 4    glucose blood VI test strips (ONETOUCH VERIO) strip 1 each by In Vitro route 2 times daily As needed. 100 each 3    Lancets MISC Uses two daily 100 each 3    Omeprazole Magnesium (PRILOSEC OTC PO) Take by mouth daily       Multiple Vitamin (ONE-A-DAY MENS PO) Take  by mouth. No current facility-administered medications for this visit. Patient has no known allergies. Review of Systems:  General ROS: negative  Psychological ROS: negative  Hematological and Lymphatic ROS: No history of blood clots or bleeding disorder.    Respiratory ROS: no cough, shortness of breath, or wheezing  Cardiovascular ROS: no chest pain or dyspnea on exertion  Gastrointestinal ROS: no abdominal pain, change in bowel habits, or black or bloody stools  Genito-Urinary ROS: no dysuria, trouble voiding, or hematuria  Musculoskeletal ROS: negative  Neurological ROS: no TIA or stroke symptoms  Dermatological ROS: negative    VITALS:  Blood pressure 128/86, pulse 75, temperature 97.9 °F (36.6 °C), temperature source Infrared, weight 260 lb (117.9 kg). Body mass index is 36.78 kg/m². Physical Examination:  General appearance - alert, well appearing, and in no distress  Mental status - alert, oriented to person, place, and time  Neck - Neck is supple, no JVD or carotid bruits. No thyromegaly or adenopathy. Chest - clear to auscultation, no wheezes, rales or rhonchi, symmetric air entry  Heart - normal rate, regular rhythm, normal S1, S2, no murmurs, rubs, clicks or gallops  Abdomen - soft, nontender, nondistended, no masses or organomegaly  Neurological - alert, oriented, normal speech, no focal findings or movement disorder noted  Extremities - peripheral pulses normal, no pedal edema, no clubbing or cyanosis  Skin - normal coloration and turgor, no rashes, no suspicious skin lesions noted      EKG:     Orders Placed This Encounter   Procedures    EKG 12 Lead       ASSESSMENT:     Diagnosis Orders   1. Coronary artery disease involving native coronary artery of native heart without angina pectoris  EKG 12 Lead   2. History of non-ST elevation myocardial infarction (NSTEMI)     3. History of tobacco abuse     4. Class 1 obesity due to excess calories without serious comorbidity with body mass index (BMI) of 34.0 to 34.9 in adult     5. Other hyperlipidemia           PLAN:       As always, aggressive risk factor modification is strongly recommended. We should adhere to the JNC VIII guidelines for HTN management and the NCEP ATP III guidelines for LDL-C management. Cardiac diet is always recommended with low fat, cholesterol, calories and sodium. Continue medications at current doses.      Patient was advised and encouraged to check blood pressure at home or at a pharmacy, maintain a logbook, and also call us back if blood pressure are above the target ranges or if it is low. Patient clearly understands and agrees to the instructions. We will need to continue to monitor muscle and liver enzymes, BUN, CR, and electrolytes. Cont with ASA. Ok to stop Brilinta. Follow up with PCP for elevated liver enzymes. Check EKG     Details of medical condition explained and patient was warned about adverse consequences of uncontrolled medical conditions and possible side effects of prescribed medications. The proper use and anticipated side effects of nitroglycerine has been carefully explained. If a single episode of chest pain is not relieved by one tablet, the patient will try another within 5 minutes; and if this doesn't relieve the pain, the patient is instructed to call 911 for transportation to an emergency department. Patient was instructed not to shovel snow.

## 2023-02-18 LAB
GLUCOSE BLD-MCNC: 171 MG/DL (ref 74–99)
GLUCOSE BLD-MCNC: 227 MG/DL (ref 74–99)

## 2023-02-19 LAB
GLUCOSE BLD-MCNC: 142 MG/DL (ref 74–99)
GLUCOSE BLD-MCNC: 243 MG/DL (ref 74–99)
GLUCOSE BLD-MCNC: 248 MG/DL (ref 74–99)

## 2023-07-31 ENCOUNTER — OFFICE VISIT (OUTPATIENT)
Dept: PRIMARY CARE | Facility: CLINIC | Age: 48
End: 2023-07-31
Payer: COMMERCIAL

## 2023-07-31 VITALS
DIASTOLIC BLOOD PRESSURE: 88 MMHG | HEART RATE: 101 BPM | BODY MASS INDEX: 34.03 KG/M2 | SYSTOLIC BLOOD PRESSURE: 132 MMHG | RESPIRATION RATE: 20 BRPM | WEIGHT: 237.2 LBS | OXYGEN SATURATION: 96 % | TEMPERATURE: 98.3 F

## 2023-07-31 DIAGNOSIS — I10 HYPERTENSION, UNSPECIFIED TYPE: ICD-10-CM

## 2023-07-31 DIAGNOSIS — K21.9 GASTROESOPHAGEAL REFLUX DISEASE, UNSPECIFIED WHETHER ESOPHAGITIS PRESENT: ICD-10-CM

## 2023-07-31 DIAGNOSIS — N40.0 BENIGN PROSTATIC HYPERPLASIA, UNSPECIFIED WHETHER LOWER URINARY TRACT SYMPTOMS PRESENT: ICD-10-CM

## 2023-07-31 DIAGNOSIS — Z12.11 SCREEN FOR COLON CANCER: ICD-10-CM

## 2023-07-31 DIAGNOSIS — E78.5 HYPERLIPIDEMIA, UNSPECIFIED HYPERLIPIDEMIA TYPE: ICD-10-CM

## 2023-07-31 DIAGNOSIS — K57.90 DIVERTICULOSIS: ICD-10-CM

## 2023-07-31 DIAGNOSIS — E11.9 TYPE 2 DIABETES MELLITUS WITHOUT COMPLICATION, UNSPECIFIED WHETHER LONG TERM INSULIN USE (MULTI): ICD-10-CM

## 2023-07-31 PROBLEM — I22.2 SUBSEQUENT NON-ST ELEVATION (NSTEMI) MYOCARDIAL INFARCTION (MULTI): Status: ACTIVE | Noted: 2023-07-31

## 2023-07-31 PROBLEM — I25.10 CAD (CORONARY ARTERY DISEASE): Status: ACTIVE | Noted: 2023-07-31

## 2023-07-31 PROBLEM — R51.9 HEADACHE: Status: ACTIVE | Noted: 2023-07-31

## 2023-07-31 PROBLEM — F32.A MILD DEPRESSION: Status: ACTIVE | Noted: 2023-07-31

## 2023-07-31 PROBLEM — F41.1 GENERALIZED ANXIETY DISORDER: Status: ACTIVE | Noted: 2023-07-31

## 2023-07-31 PROCEDURE — 3052F HG A1C>EQUAL 8.0%<EQUAL 9.0%: CPT | Performed by: FAMILY MEDICINE

## 2023-07-31 PROCEDURE — 3008F BODY MASS INDEX DOCD: CPT | Performed by: FAMILY MEDICINE

## 2023-07-31 PROCEDURE — 4010F ACE/ARB THERAPY RXD/TAKEN: CPT | Performed by: FAMILY MEDICINE

## 2023-07-31 PROCEDURE — 3075F SYST BP GE 130 - 139MM HG: CPT | Performed by: FAMILY MEDICINE

## 2023-07-31 PROCEDURE — 3079F DIAST BP 80-89 MM HG: CPT | Performed by: FAMILY MEDICINE

## 2023-07-31 PROCEDURE — 99213 OFFICE O/P EST LOW 20 MIN: CPT | Performed by: FAMILY MEDICINE

## 2023-07-31 RX ORDER — METOPROLOL TARTRATE 25 MG/1
TABLET, FILM COATED ORAL 2 TIMES DAILY
COMMUNITY
Start: 2021-04-01 | End: 2023-07-31 | Stop reason: SDUPTHER

## 2023-07-31 RX ORDER — TRAZODONE HYDROCHLORIDE 50 MG/1
50 TABLET ORAL NIGHTLY
COMMUNITY
Start: 2023-06-27 | End: 2024-01-31 | Stop reason: SDUPTHER

## 2023-07-31 RX ORDER — ATORVASTATIN CALCIUM 80 MG/1
1 TABLET, FILM COATED ORAL DAILY
COMMUNITY
Start: 2021-04-01 | End: 2023-07-31 | Stop reason: SDUPTHER

## 2023-07-31 RX ORDER — GLIMEPIRIDE 2 MG/1
2 TABLET ORAL DAILY
Qty: 90 TABLET | Refills: 1 | Status: SHIPPED | OUTPATIENT
Start: 2023-07-31 | End: 2024-01-31 | Stop reason: SDUPTHER

## 2023-07-31 RX ORDER — NITROGLYCERIN 0.4 MG/1
TABLET SUBLINGUAL
COMMUNITY
Start: 2021-02-27

## 2023-07-31 RX ORDER — METFORMIN HYDROCHLORIDE 500 MG/1
TABLET ORAL
COMMUNITY
Start: 2018-02-20 | End: 2023-07-31 | Stop reason: SDUPTHER

## 2023-07-31 RX ORDER — VENLAFAXINE HYDROCHLORIDE 150 MG/1
150 CAPSULE, EXTENDED RELEASE ORAL
COMMUNITY
Start: 2023-06-29 | End: 2024-01-31 | Stop reason: SDUPTHER

## 2023-07-31 RX ORDER — LOSARTAN POTASSIUM 25 MG/1
25 TABLET ORAL DAILY
Qty: 90 TABLET | Refills: 1 | Status: SHIPPED
Start: 2023-07-31 | End: 2024-01-31 | Stop reason: SDUPTHER

## 2023-07-31 RX ORDER — DICYCLOMINE HYDROCHLORIDE 20 MG/1
1 TABLET ORAL EVERY 6 HOURS
COMMUNITY
Start: 2017-05-02 | End: 2023-07-31 | Stop reason: SDUPTHER

## 2023-07-31 RX ORDER — OMEPRAZOLE 20 MG/1
20 CAPSULE, DELAYED RELEASE ORAL DAILY
Qty: 90 CAPSULE | Refills: 1 | Status: SHIPPED | OUTPATIENT
Start: 2023-07-31 | End: 2024-01-31 | Stop reason: SDUPTHER

## 2023-07-31 RX ORDER — BLOOD-GLUCOSE METER
EACH MISCELLANEOUS 2 TIMES DAILY
COMMUNITY
Start: 2021-02-22 | End: 2024-01-31 | Stop reason: SDUPTHER

## 2023-07-31 RX ORDER — ATORVASTATIN CALCIUM 80 MG/1
80 TABLET, FILM COATED ORAL DAILY
Qty: 90 TABLET | Refills: 1 | Status: SHIPPED
Start: 2023-07-31 | End: 2024-01-31 | Stop reason: SDUPTHER

## 2023-07-31 RX ORDER — METFORMIN HYDROCHLORIDE 500 MG/1
500 TABLET ORAL
Qty: 90 TABLET | Refills: 1 | Status: SHIPPED | OUTPATIENT
Start: 2023-07-31 | End: 2024-01-18

## 2023-07-31 RX ORDER — DICYCLOMINE HYDROCHLORIDE 20 MG/1
20 TABLET ORAL EVERY 6 HOURS
Qty: 90 TABLET | Refills: 1 | Status: SHIPPED
Start: 2023-07-31 | End: 2024-01-31 | Stop reason: WASHOUT

## 2023-07-31 RX ORDER — GLIMEPIRIDE 2 MG/1
1 TABLET ORAL DAILY
COMMUNITY
Start: 2018-02-20 | End: 2023-07-31 | Stop reason: SDUPTHER

## 2023-07-31 RX ORDER — LOSARTAN POTASSIUM 25 MG/1
TABLET ORAL
COMMUNITY
Start: 2021-04-01 | End: 2023-07-31 | Stop reason: SDUPTHER

## 2023-07-31 RX ORDER — OMEPRAZOLE 20 MG/1
1 CAPSULE, DELAYED RELEASE ORAL DAILY
COMMUNITY
End: 2023-07-31 | Stop reason: SDUPTHER

## 2023-07-31 RX ORDER — METOPROLOL TARTRATE 25 MG/1
25 TABLET, FILM COATED ORAL DAILY
Qty: 90 TABLET | Refills: 1 | Status: SHIPPED
Start: 2023-07-31 | End: 2024-01-31 | Stop reason: SDUPTHER

## 2023-07-31 NOTE — PROGRESS NOTES
Subjective   Patient ID: Russell Mckeon is a 47 y.o. male who presents for Diabetes, Hyperlipidemia, and Hypertension.    HPI  Presents today for above reason    Pt tries to follow low fat/sugar/salt diet  Exercises: work only  Denies SOB/chest pain/palpitations  Pt is not a smoker  No urinary or bowel issues per pt.  Sleeping well  Medications working well  Pt does not check b/p at home  Pt does check sugar levels at home sometimes    Pt has not taken diabetic medication in weeks  Because of this pt does not feel well.    PSA and A1c ordered    Review of Systems  Constitutional:  no chills, no fever and no night sweats.  Eyes: no blurred vision and no eyesight problems.  ENT: no hearing loss, no nasal congestion, no hoarseness and no sore throat.  Neck: no mass (es) and no swelling.  Cardiovascular: no chest pain, no intermittent leg claudication, no lower extremity edema, no palpitation and no syncope.  Respiratory: no cough, no shortness of breath during exertion, no shortness of breath at rest and no wheezing.  Gastrointestinal: no abdominal pain, no blood in stools, no constipation, no diarrhea, no melena, no nausea, no rectal pain and no vomiting.  Genitourinary: no dysuria, no change in urinary frequency, no urinary hesitancy and no feelings of urinary urgency.  Musculoskeletal: no arthralgias, no back pain and no myalgias.  Integumentary: no new skin lesions and no rashes.  Neurological: no difficulty walking, no headache, no limb weakness, no numbness and no tingling.  Psychiatric/Behavioral: no anxiety, no depression, no anhedonia and no substance use disorders.  Endocrine: no recent weight gain and no recent weight loss.  Hematologic/Lymphatic: no tendency for easy bruising and no swollen glands    Objective   Physical Exam  Patient in today not feeling well states that taking all his medication about 3 weeks ago wife asked for divorce if he is just going through that considered suicide and his wife  got scared to call the police he was taken in the psych [facility] after 3 days there admitted that he does not want to die he is getting therapy on new medication feeling better needs refill on his diabetic medicine and blood pressure medication slightly tachycardic today with still has not lost any weight lungs are clear cardiac and abdominal exams otherwise benign.  Physical exam today's office visit constitutional alert and oriented x3.    Head is atraumatic HEENT is within normal limits.    Neck supple no masses full range of motion.    Thyroid is normal in size no thyromegaly there is no carotid bruits.    Pulmonary exam shows clear to auscultation no respiratory distress.    Cardiovascular shows no murmur rub or gallop.  Regular rate and rhythm.    Abdominal exam soft nontender no hepatosplenomegaly or masses normal bowel sounds no rebound no guarding.    Musculoskeletal exam no joint pain no muscle pain full range of motion.    Psych exam normal mood and affect.    Dermatologic exam no skin lesions no rash no blemishes.    Neuro exam is no focal deficits.  Normal exam.    Extremities no edema normal pulses normal capillary refill.    /88   Pulse 101   Temp 36.8 °C (98.3 °F)   Resp 20   Wt 108 kg (237 lb 3.2 oz)   SpO2 96%   BMI 34.03 kg/m²     Lab Results   Component Value Date    WBC 5.9 02/18/2023    HGB 14.6 02/18/2023    HCT 39.3 (L) 02/18/2023    MCV 85 02/18/2023     02/18/2023       Assessment/Plan plan restart his medication recheck him in 1 months if he needs anything sooner he will let us know we will see him immediately.  Problem List Items Addressed This Visit       BPH (benign prostatic hyperplasia)    Diverticulosis    GERD (gastroesophageal reflux disease)    HTN (hypertension)    Hyperlipidemia    Type 2 diabetes mellitus (CMS/HCC)     Other Visit Diagnoses       Screen for colon cancer

## 2024-01-15 DIAGNOSIS — E11.9 TYPE 2 DIABETES MELLITUS WITHOUT COMPLICATION, UNSPECIFIED WHETHER LONG TERM INSULIN USE (MULTI): ICD-10-CM

## 2024-01-18 RX ORDER — METFORMIN HYDROCHLORIDE 500 MG/1
500 TABLET ORAL
Qty: 30 TABLET | Refills: 0 | Status: SHIPPED | OUTPATIENT
Start: 2024-01-18 | End: 2024-01-31 | Stop reason: SDUPTHER

## 2024-01-31 ENCOUNTER — OFFICE VISIT (OUTPATIENT)
Dept: PRIMARY CARE | Facility: CLINIC | Age: 49
End: 2024-01-31
Payer: COMMERCIAL

## 2024-01-31 VITALS
DIASTOLIC BLOOD PRESSURE: 90 MMHG | TEMPERATURE: 97.2 F | HEIGHT: 70 IN | WEIGHT: 244 LBS | OXYGEN SATURATION: 97 % | BODY MASS INDEX: 34.93 KG/M2 | SYSTOLIC BLOOD PRESSURE: 138 MMHG | HEART RATE: 97 BPM

## 2024-01-31 DIAGNOSIS — E11.9 TYPE 2 DIABETES MELLITUS WITHOUT COMPLICATION, WITHOUT LONG-TERM CURRENT USE OF INSULIN (MULTI): ICD-10-CM

## 2024-01-31 DIAGNOSIS — E78.5 HYPERLIPIDEMIA, UNSPECIFIED HYPERLIPIDEMIA TYPE: ICD-10-CM

## 2024-01-31 DIAGNOSIS — G47.00 INSOMNIA, UNSPECIFIED TYPE: ICD-10-CM

## 2024-01-31 DIAGNOSIS — K21.9 GASTROESOPHAGEAL REFLUX DISEASE, UNSPECIFIED WHETHER ESOPHAGITIS PRESENT: ICD-10-CM

## 2024-01-31 DIAGNOSIS — I10 HYPERTENSION, UNSPECIFIED TYPE: ICD-10-CM

## 2024-01-31 DIAGNOSIS — F32.A MILD DEPRESSION: ICD-10-CM

## 2024-01-31 DIAGNOSIS — E11.9 TYPE 2 DIABETES MELLITUS WITHOUT COMPLICATION, UNSPECIFIED WHETHER LONG TERM INSULIN USE (MULTI): ICD-10-CM

## 2024-01-31 DIAGNOSIS — N40.0 BENIGN PROSTATIC HYPERPLASIA, UNSPECIFIED WHETHER LOWER URINARY TRACT SYMPTOMS PRESENT: ICD-10-CM

## 2024-01-31 PROCEDURE — 99396 PREV VISIT EST AGE 40-64: CPT | Performed by: FAMILY MEDICINE

## 2024-01-31 PROCEDURE — 3008F BODY MASS INDEX DOCD: CPT | Performed by: FAMILY MEDICINE

## 2024-01-31 PROCEDURE — 3075F SYST BP GE 130 - 139MM HG: CPT | Performed by: FAMILY MEDICINE

## 2024-01-31 PROCEDURE — 1036F TOBACCO NON-USER: CPT | Performed by: FAMILY MEDICINE

## 2024-01-31 PROCEDURE — 4010F ACE/ARB THERAPY RXD/TAKEN: CPT | Performed by: FAMILY MEDICINE

## 2024-01-31 PROCEDURE — 3080F DIAST BP >= 90 MM HG: CPT | Performed by: FAMILY MEDICINE

## 2024-01-31 RX ORDER — VENLAFAXINE HYDROCHLORIDE 150 MG/1
150 CAPSULE, EXTENDED RELEASE ORAL
Qty: 90 CAPSULE | Refills: 1 | Status: SHIPPED | OUTPATIENT
Start: 2024-01-31

## 2024-01-31 RX ORDER — OMEPRAZOLE 20 MG/1
20 CAPSULE, DELAYED RELEASE ORAL DAILY
Qty: 90 CAPSULE | Refills: 1 | Status: SHIPPED | OUTPATIENT
Start: 2024-01-31 | End: 2024-05-09 | Stop reason: SDUPTHER

## 2024-01-31 RX ORDER — ATORVASTATIN CALCIUM 80 MG/1
80 TABLET, FILM COATED ORAL DAILY
Qty: 90 TABLET | Refills: 1 | Status: SHIPPED | OUTPATIENT
Start: 2024-01-31

## 2024-01-31 RX ORDER — TRAZODONE HYDROCHLORIDE 50 MG/1
50 TABLET ORAL NIGHTLY
Qty: 90 TABLET | Refills: 1 | Status: SHIPPED | OUTPATIENT
Start: 2024-01-31

## 2024-01-31 RX ORDER — BLOOD-GLUCOSE METER
EACH MISCELLANEOUS
Qty: 100 EACH | Refills: 0 | Status: SHIPPED | OUTPATIENT
Start: 2024-01-31

## 2024-01-31 RX ORDER — LOSARTAN POTASSIUM 25 MG/1
25 TABLET ORAL DAILY
Qty: 90 TABLET | Refills: 1 | Status: SHIPPED | OUTPATIENT
Start: 2024-01-31

## 2024-01-31 RX ORDER — METFORMIN HYDROCHLORIDE 500 MG/1
500 TABLET ORAL
Qty: 30 TABLET | Refills: 0 | Status: SHIPPED | OUTPATIENT
Start: 2024-01-31 | End: 2024-03-27 | Stop reason: WASHOUT

## 2024-01-31 RX ORDER — METOPROLOL TARTRATE 25 MG/1
25 TABLET, FILM COATED ORAL DAILY
Qty: 90 TABLET | Refills: 1 | Status: SHIPPED | OUTPATIENT
Start: 2024-01-31

## 2024-01-31 RX ORDER — GLIMEPIRIDE 2 MG/1
2 TABLET ORAL DAILY
Qty: 90 TABLET | Refills: 1 | Status: SHIPPED | OUTPATIENT
Start: 2024-01-31 | End: 2024-03-27 | Stop reason: SDUPTHER

## 2024-01-31 NOTE — PROGRESS NOTES
Subjective   Patient ID: Russell Mckeon is a 48 y.o. male who presents for Hypertension, Hyperlipidemia, Diabetes, Chest Pain, and Annual Exam.    HPI  Presents today for above reason    Last eye exam: 04/2023  Last dental: 2 years ago  Pt does not follow low fat/sugar/salt diet  Does not exercise   Denies palpitations  Pt is not a smoker  Pt does check sugars at home occasionally   No urinary or bowel issues per pt.  Not sleeping well, reports sleeping 4 hours a night   Medications working well    Chest pain  Ongoing x3 days   Pain is described as sharp/ache   Patient has H/O heart attack 2 years ago    No other concerns today    Yearly BW ordered  Colon screen ordered 7/31/2023. Pt reminded    Review of Systems  Constitutional:  no chills, no fever and no night sweats.  Eyes: no blurred vision and no eyesight problems.  ENT: no hearing loss, no nasal congestion, no hoarseness and no sore throat.  Neck: no mass (es) and no swelling.  Cardiovascular: no chest pain, no intermittent leg claudication, no lower extremity edema, no palpitation and no syncope.  Respiratory: no cough, no shortness of breath during exertion, no shortness of breath at rest and no wheezing.  Gastrointestinal: no abdominal pain, no blood in stools, no constipation, no diarrhea, no melena, no nausea, no rectal pain and no vomiting.  Genitourinary: no dysuria, no change in urinary frequency, no urinary hesitancy and no feelings of urinary urgency.  Musculoskeletal: no arthralgias, no back pain and no myalgias.  Integumentary: no new skin lesions and no rashes.  Neurological: no difficulty walking, no headache, no limb weakness, no numbness and no tingling.  Psychiatric/Behavioral: no anxiety, no depression, no anhedonia and no substance use disorders.  Endocrine: no recent weight gain and no recent weight loss.  Hematologic/Lymphatic: no tendency for easy bruising and no swollen glands    Objective   Physical Exam  Patient here for  "follow-up chronic medical problems hypertension hyperlipidemia diabetes increased stress going through divorce  Was drinking heavily and was placed in a psych unit as he told his wife he was going to kill himself and he does have guns in the house he states he was cleared after getting sober he has not had a drink in a while not sure exactly how long it has been thinks it has been at least a few months.  He needs medication refill and blood work.  Well-developed well-nourished mildly obese male in no acute distress physical exam today's office visit constitutional alert and oriented x3.    Head is atraumatic HEENT is within normal limits.    Neck supple no masses full range of motion.    Thyroid is normal in size no thyromegaly there is no carotid bruits.    Pulmonary exam shows clear to auscultation no respiratory distress.    Cardiovascular shows no murmur rub or gallop.  Regular rate and rhythm.    Abdominal exam soft nontender no hepatosplenomegaly or masses normal bowel sounds no rebound no guarding.    Musculoskeletal exam no joint pain no muscle pain full range of motion.    Psych exam normal mood and affect.    Dermatologic exam no skin lesions no rash no blemishes.    Neuro exam is no focal deficits.  Normal exam.    Extremities no edema normal pulses normal capillary refill.  He denies chest pain or shortness of breath with exertion.  /90 (BP Location: Right arm, Patient Position: Sitting, BP Cuff Size: Large adult)   Pulse 97   Temp 36.2 °C (97.2 °F) (Temporal)   Ht 1.778 m (5' 10\")   Wt 111 kg (244 lb)   SpO2 97%   BMI 35.01 kg/m²     Lab Results   Component Value Date    WBC 5.9 02/18/2023    HGB 14.6 02/18/2023    HCT 39.3 (L) 02/18/2023    MCV 85 02/18/2023     02/18/2023       Assessment/Plan plan is to restart his medication and have him follow-up in a month to recheck his blood pressure we will follow-up with him in the interim after we get his lab results.  Problem List Items " Addressed This Visit       BPH (benign prostatic hyperplasia)    GERD (gastroesophageal reflux disease)    HTN (hypertension)    Hyperlipidemia    Mild depression    Type 2 diabetes mellitus (CMS/[facility])     Other Visit Diagnoses       Insomnia, unspecified type

## 2024-03-27 ENCOUNTER — OFFICE VISIT (OUTPATIENT)
Dept: PRIMARY CARE | Facility: CLINIC | Age: 49
End: 2024-03-27
Payer: COMMERCIAL

## 2024-03-27 VITALS
HEART RATE: 85 BPM | SYSTOLIC BLOOD PRESSURE: 116 MMHG | OXYGEN SATURATION: 98 % | BODY MASS INDEX: 33.64 KG/M2 | HEIGHT: 70 IN | WEIGHT: 235 LBS | RESPIRATION RATE: 17 BRPM | DIASTOLIC BLOOD PRESSURE: 84 MMHG | TEMPERATURE: 96.8 F

## 2024-03-27 DIAGNOSIS — F32.A MILD DEPRESSION: ICD-10-CM

## 2024-03-27 DIAGNOSIS — N40.0 BENIGN PROSTATIC HYPERPLASIA WITHOUT LOWER URINARY TRACT SYMPTOMS: Primary | ICD-10-CM

## 2024-03-27 DIAGNOSIS — E11.9 TYPE 2 DIABETES MELLITUS WITHOUT COMPLICATION, UNSPECIFIED WHETHER LONG TERM INSULIN USE (MULTI): ICD-10-CM

## 2024-03-27 DIAGNOSIS — I10 HYPERTENSION, UNSPECIFIED TYPE: ICD-10-CM

## 2024-03-27 DIAGNOSIS — E78.5 HYPERLIPIDEMIA, UNSPECIFIED HYPERLIPIDEMIA TYPE: ICD-10-CM

## 2024-03-27 DIAGNOSIS — G47.00 INSOMNIA, UNSPECIFIED TYPE: ICD-10-CM

## 2024-03-27 LAB — POC HEMOGLOBIN A1C: 12.7 % (ref 4.2–6.5)

## 2024-03-27 PROCEDURE — 83036 HEMOGLOBIN GLYCOSYLATED A1C: CPT | Performed by: FAMILY MEDICINE

## 2024-03-27 PROCEDURE — 3074F SYST BP LT 130 MM HG: CPT | Performed by: FAMILY MEDICINE

## 2024-03-27 PROCEDURE — 3079F DIAST BP 80-89 MM HG: CPT | Performed by: FAMILY MEDICINE

## 2024-03-27 PROCEDURE — 1036F TOBACCO NON-USER: CPT | Performed by: FAMILY MEDICINE

## 2024-03-27 PROCEDURE — 4010F ACE/ARB THERAPY RXD/TAKEN: CPT | Performed by: FAMILY MEDICINE

## 2024-03-27 PROCEDURE — 99213 OFFICE O/P EST LOW 20 MIN: CPT | Performed by: FAMILY MEDICINE

## 2024-03-27 RX ORDER — METFORMIN HYDROCHLORIDE 500 MG/1
500 TABLET ORAL
Qty: 90 TABLET | Refills: 1 | Status: CANCELLED | OUTPATIENT
Start: 2024-03-27

## 2024-03-27 RX ORDER — METFORMIN HYDROCHLORIDE 1000 MG/1
1000 TABLET ORAL
Qty: 60 TABLET | Refills: 4 | Status: SHIPPED | OUTPATIENT
Start: 2024-03-27 | End: 2024-04-26

## 2024-03-27 RX ORDER — GLIMEPIRIDE 2 MG/1
2 TABLET ORAL DAILY
Qty: 90 TABLET | Refills: 1 | Status: SHIPPED | OUTPATIENT
Start: 2024-03-27

## 2024-03-27 NOTE — PROGRESS NOTES
Subjective   Patient ID: Russell Mckeon is a 48 y.o. male who presents for Hypertension, Hyperlipidemia, and Diabetes.  HPI    HTN follow up  Denies chest pain,SOB, swelling, headaches, lightheadedness or dizziness.   Eats a generally healthy diet, Exercises.   Does check BP at home.   Currently taking atorvastatin, losartan, metoprolol    DM  Does not check glucose at home. Eats a generally healthy diet, Exercises. Currently taking metformin, glimepiride. Last A1c on 2/18/23 @ 9.0 %. Last eye exam 1.5 years ago. Does not see a Podiatrist.      Patient in office being seen for a follow up on Insomnia. Currently taking trazodone. Last took 2 nights ago. Reports that the medication is working 6/10. 60% managed with the medication. States there are no adverse effects.     Review of Systems  Constitutional:  no chills, no fever and no night sweats.  Eyes: no blurred vision and no eyesight problems.  ENT: no hearing loss, no nasal congestion, no hoarseness and no sore throat.  Neck: no mass (es) and no swelling.  Cardiovascular: no chest pain, no intermittent leg claudication, no lower extremity edema, no palpitation and no syncope.  Respiratory: no cough, no shortness of breath during exertion, no shortness of breath at rest and no wheezing.  Gastrointestinal: no abdominal pain, no blood in stools, no constipation, no diarrhea, no melena, no nausea, no rectal pain and no vomiting.  Genitourinary: no dysuria, no change in urinary frequency, no urinary hesitancy and no feelings of urinary urgency.  Musculoskeletal: no arthralgias, no back pain and no myalgias.  Integumentary: no new skin lesions and no rashes.  Neurological: no difficulty walking, no headache, no limb weakness, no numbness and no tingling.  Psychiatric/Behavioral: no anxiety, no depression, no anhedonia and no substance use disorders.  Endocrine: no recent weight gain and no recent weight loss.  Hematologic/Lymphatic: no tendency for easy bruising  "and no swollen glands    Objective   Physical Exam  Patient in for follow-up hypertension hyperlipidemia and diabetes has not had blood drawn in almost a year.  Advised he needs to check with us every 4 to 6 months.  Fingerstick hemoglobin A1c today is 12.9.  States he is drinking 10-12 regular pops a day.  Exam obese male in no acute distress feels fatigued and tired does not eat healthy lives alone tends to eat fast food.  Physical exam today's office visit constitutional alert and oriented x3.    Head is atraumatic HEENT is within normal limits.    Neck supple no masses full range of motion.    Thyroid is normal in size no thyromegaly there is no carotid bruits.    Pulmonary exam shows clear to auscultation no respiratory distress.    Cardiovascular shows no murmur rub or gallop.  Regular rate and rhythm.    Abdominal exam soft nontender no hepatosplenomegaly or masses normal bowel sounds no rebound no guarding.    Musculoskeletal exam no joint pain no muscle pain full range of motion.    Psych exam normal mood and affect.    Dermatologic exam no skin lesions no rash no blemishes.    Neuro exam is no focal deficits.  Normal exam.    Extremities no edema normal pulses normal capillary refill.    /84   Pulse 85   Temp 36 °C (96.8 °F) (Temporal)   Resp 17   Ht 1.778 m (5' 10\")   Wt 107 kg (235 lb)   SpO2 98%   BMI 33.72 kg/m²     Lab Results   Component Value Date    WBC 5.9 02/18/2023    HGB 14.6 02/18/2023    HCT 39.3 (L) 02/18/2023    MCV 85 02/18/2023     02/18/2023       Assessment/Plan plan is to have him stop drinking sugary drinks try to eat more healthy diet and get some physical activity and will get blood drawn and follow-up when we have the results we will repeat A1c in 4 months.  Also he is on a low-dose of metformin will increase that with 1000 mg twice daily for the time being stay on the glimepiride in the morning the goal will be to get him off of that may need to add Actos or " Jardiance or Farxiga to control things.  He was advised if he does not take this more seriously he is at high risk for cardiovascular vision and renal complications.  Problem List Items Addressed This Visit          Cardiac and Vasculature    HTN (hypertension)    Relevant Orders    CBC and Auto Differential    Comprehensive Metabolic Panel    Hyperlipidemia    Relevant Orders    Lipid Panel       Endocrine/Metabolic    Type 2 diabetes mellitus (CMS/HCC)    Relevant Medications    glimepiride (Amaryl) 2 mg tablet    metFORMIN (Glucophage) 1,000 mg tablet    Other Relevant Orders    POCT glycosylated hemoglobin (Hb A1C) manually resulted (Completed)    CBC and Auto Differential    Comprehensive Metabolic Panel    Hemoglobin A1C       Genitourinary and Reproductive    BPH (benign prostatic hyperplasia) - Primary    Relevant Orders    Prostate Specific Antigen       Mental Health    Mild depression     Other Visit Diagnoses       Insomnia, unspecified type

## 2024-05-09 DIAGNOSIS — K21.9 GASTROESOPHAGEAL REFLUX DISEASE, UNSPECIFIED WHETHER ESOPHAGITIS PRESENT: ICD-10-CM

## 2024-05-09 RX ORDER — OMEPRAZOLE 20 MG/1
20 CAPSULE, DELAYED RELEASE ORAL DAILY
Qty: 90 CAPSULE | Refills: 1 | Status: SHIPPED | OUTPATIENT
Start: 2024-05-09

## 2024-05-09 NOTE — TELEPHONE ENCOUNTER
Rx Refill Request Telephone Encounter    Name:  Russell Mckeon  : 1975     Medication Name:  Omeprazole  Dose (Optional):    20 mg  Quantity (Optional):    #90  Directions (Optional):   Take 1 capsule (20mg) by mouth once daily     ALLERGIES:   nkda     Specific Pharmacy location:  [facility]     Date of last appointment:  3/27/24  Date of next appointment:  24    Best number to reach patient:  [phone]

## 2024-06-15 DIAGNOSIS — E11.9 TYPE 2 DIABETES MELLITUS WITHOUT COMPLICATION, UNSPECIFIED WHETHER LONG TERM INSULIN USE (MULTI): ICD-10-CM

## 2024-06-17 RX ORDER — METFORMIN HYDROCHLORIDE 1000 MG/1
1000 TABLET ORAL
Qty: 60 TABLET | Refills: 2 | Status: SHIPPED | OUTPATIENT
Start: 2024-06-17

## 2024-08-01 ENCOUNTER — APPOINTMENT (OUTPATIENT)
Dept: PRIMARY CARE | Facility: CLINIC | Age: 49
End: 2024-08-01
Payer: COMMERCIAL

## 2024-08-06 NOTE — PROGRESS NOTES
Subjective   Patient ID: Russell Mckeon is a 48 y.o. male who presents for Diabetes.  HPI    Patient presents in the office today for a follow up on diabetes.  Does not check glucose at home. Does not eat a generally healthy diet, Exercises. Currently taking glimepiride and metformin. Last A1c on 8/7/24 @ 6.9 %. Last eye exam 1.5 years ago. Does not see a Podiatrist.      Review of Systems  Constitutional:  no chills, no fever and no night sweats.  Eyes: no blurred vision and no eyesight problems.  ENT: no hearing loss, no nasal congestion, no hoarseness and no sore throat.  Neck: no mass (es) and no swelling.  Cardiovascular: no chest pain, no intermittent leg claudication, no lower extremity edema, no palpitation and no syncope.  Respiratory: no cough, no shortness of breath during exertion, no shortness of breath at rest and no wheezing.  Gastrointestinal: no abdominal pain, no blood in stools, no constipation, no diarrhea, no melena, no nausea, no rectal pain and no vomiting.  Genitourinary: no dysuria, no change in urinary frequency, no urinary hesitancy and no feelings of urinary urgency.  Musculoskeletal: no arthralgias, no back pain and no myalgias.  Integumentary: no new skin lesions and no rashes.  Neurological: no difficulty walking, no headache, no limb weakness, no numbness and no tingling.  Psychiatric/Behavioral: no anxiety, no depression, no anhedonia and no substance use disorders.  Endocrine: no recent weight gain and no recent weight loss.  Hematologic/Lymphatic: no tendency for easy bruising and no swollen glands    Objective   Physical Exam  Patient here for follow-up of type 2 diabetes hypertension hyperlipidemia patient states he gets plenty of sleep but still fatigued.  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  Well-developed well-nourished mildly obese male in  "no acute distress physical exam today's office visit constitutional alert and oriented x3.    Head is atraumatic HEENT is within normal limits.    Neck supple no masses full range of motion.    Thyroid is normal in size no thyromegaly there is no carotid bruits.    Pulmonary exam shows clear to auscultation no respiratory distress.    Cardiovascular shows no murmur rub or gallop.  Regular rate and rhythm.    Abdominal exam soft nontender no hepatosplenomegaly or masses normal bowel sounds no rebound no guarding.    Musculoskeletal exam no joint pain no muscle pain full range of motion.    Psych exam normal mood and affect.    Dermatologic exam no skin lesions no rash no blemishes.    Neuro exam is no focal deficits.  Normal exam.    Extremities no edema normal pulses normal capillary refill.    /86 (BP Location: Left leg, Patient Position: Sitting)   Pulse 95   Temp 36.2 °C (97.1 °F) (Temporal)   Ht 1.778 m (5' 10\")   Wt 104 kg (228 lb 9.6 oz)   SpO2 97%   BMI 32.80 kg/m²     Lab Results   Component Value Date    WBC 5.9 02/18/2023    HGB 14.6 02/18/2023    HCT 39.3 (L) 02/18/2023    MCV 85 02/18/2023     02/18/2023       Assessment/Plan patient denies chest pain or shortness of breath with exertion just some fatigue give for blood work we will get that done Pap we have the results A1c today 6.9 much improved last time it was checked it was 9 advised he really needs to be here every 6 months for routine checkups  Problem List Items Addressed This Visit       GERD (gastroesophageal reflux disease)    HTN (hypertension)    Hyperlipidemia    Type 2 diabetes mellitus (Multi) - Primary    Relevant Orders    POCT glycosylated hemoglobin (Hb A1C) manually resulted     Other Visit Diagnoses       BMI 32.0-32.9,adult        Class 1 obesity due to excess calories with serious comorbidity and body mass index (BMI) of 32.0 to 32.9 in adult        Insomnia, unspecified type              "

## 2024-08-07 ENCOUNTER — LAB (OUTPATIENT)
Dept: LAB | Facility: LAB | Age: 49
End: 2024-08-07
Payer: COMMERCIAL

## 2024-08-07 ENCOUNTER — APPOINTMENT (OUTPATIENT)
Dept: PRIMARY CARE | Facility: CLINIC | Age: 49
End: 2024-08-07
Payer: COMMERCIAL

## 2024-08-07 VITALS
HEART RATE: 95 BPM | BODY MASS INDEX: 32.73 KG/M2 | DIASTOLIC BLOOD PRESSURE: 86 MMHG | SYSTOLIC BLOOD PRESSURE: 128 MMHG | WEIGHT: 228.6 LBS | TEMPERATURE: 97.1 F | OXYGEN SATURATION: 97 % | HEIGHT: 70 IN

## 2024-08-07 DIAGNOSIS — I10 HYPERTENSION, UNSPECIFIED TYPE: ICD-10-CM

## 2024-08-07 DIAGNOSIS — K21.9 GASTROESOPHAGEAL REFLUX DISEASE, UNSPECIFIED WHETHER ESOPHAGITIS PRESENT: ICD-10-CM

## 2024-08-07 DIAGNOSIS — E11.9 TYPE 2 DIABETES MELLITUS WITHOUT COMPLICATION, WITHOUT LONG-TERM CURRENT USE OF INSULIN (MULTI): ICD-10-CM

## 2024-08-07 DIAGNOSIS — E78.5 HYPERLIPIDEMIA, UNSPECIFIED HYPERLIPIDEMIA TYPE: ICD-10-CM

## 2024-08-07 DIAGNOSIS — N40.0 BENIGN PROSTATIC HYPERPLASIA WITHOUT LOWER URINARY TRACT SYMPTOMS: ICD-10-CM

## 2024-08-07 DIAGNOSIS — E11.69 TYPE 2 DIABETES MELLITUS WITH OTHER SPECIFIED COMPLICATION, UNSPECIFIED WHETHER LONG TERM INSULIN USE (MULTI): ICD-10-CM

## 2024-08-07 DIAGNOSIS — N40.0 BENIGN PROSTATIC HYPERPLASIA, UNSPECIFIED WHETHER LOWER URINARY TRACT SYMPTOMS PRESENT: ICD-10-CM

## 2024-08-07 DIAGNOSIS — G47.00 INSOMNIA, UNSPECIFIED TYPE: ICD-10-CM

## 2024-08-07 DIAGNOSIS — E66.09 CLASS 1 OBESITY DUE TO EXCESS CALORIES WITH SERIOUS COMORBIDITY AND BODY MASS INDEX (BMI) OF 32.0 TO 32.9 IN ADULT: ICD-10-CM

## 2024-08-07 DIAGNOSIS — E11.9 TYPE 2 DIABETES MELLITUS WITHOUT COMPLICATION, UNSPECIFIED WHETHER LONG TERM INSULIN USE (MULTI): ICD-10-CM

## 2024-08-07 DIAGNOSIS — E11.69 TYPE 2 DIABETES MELLITUS WITH OTHER SPECIFIED COMPLICATION, UNSPECIFIED WHETHER LONG TERM INSULIN USE (MULTI): Primary | ICD-10-CM

## 2024-08-07 DIAGNOSIS — F33.2 MAJOR DEPRESSIVE DISORDER, RECURRENT SEVERE WITHOUT PSYCHOTIC FEATURES (MULTI): ICD-10-CM

## 2024-08-07 LAB
ALBUMIN SERPL BCP-MCNC: 4.6 G/DL (ref 3.4–5)
ALP SERPL-CCNC: 67 U/L (ref 33–120)
ALT SERPL W P-5'-P-CCNC: 19 U/L (ref 10–52)
ANION GAP SERPL CALC-SCNC: 12 MMOL/L (ref 10–20)
AST SERPL W P-5'-P-CCNC: 13 U/L (ref 9–39)
BASOPHILS # BLD AUTO: 0.06 X10*3/UL (ref 0–0.1)
BASOPHILS NFR BLD AUTO: 0.6 %
BILIRUB SERPL-MCNC: 0.8 MG/DL (ref 0–1.2)
BUN SERPL-MCNC: 17 MG/DL (ref 6–23)
CALCIUM SERPL-MCNC: 9.5 MG/DL (ref 8.6–10.3)
CHLORIDE SERPL-SCNC: 102 MMOL/L (ref 98–107)
CHOLEST SERPL-MCNC: 126 MG/DL (ref 0–199)
CHOLESTEROL/HDL RATIO: 3.1
CO2 SERPL-SCNC: 30 MMOL/L (ref 21–32)
CREAT SERPL-MCNC: 1.06 MG/DL (ref 0.5–1.3)
EGFRCR SERPLBLD CKD-EPI 2021: 85 ML/MIN/1.73M*2
EOSINOPHIL # BLD AUTO: 0.15 X10*3/UL (ref 0–0.7)
EOSINOPHIL NFR BLD AUTO: 1.6 %
ERYTHROCYTE [DISTWIDTH] IN BLOOD BY AUTOMATED COUNT: 13.8 % (ref 11.5–14.5)
EST. AVERAGE GLUCOSE BLD GHB EST-MCNC: 148 MG/DL
GLUCOSE SERPL-MCNC: 122 MG/DL (ref 74–99)
HBA1C MFR BLD: 6.8 %
HCT VFR BLD AUTO: 42.6 % (ref 41–52)
HDLC SERPL-MCNC: 40.4 MG/DL
HGB BLD-MCNC: 15 G/DL (ref 13.5–17.5)
IMM GRANULOCYTES # BLD AUTO: 0.03 X10*3/UL (ref 0–0.7)
IMM GRANULOCYTES NFR BLD AUTO: 0.3 % (ref 0–0.9)
LDLC SERPL CALC-MCNC: 51 MG/DL
LYMPHOCYTES # BLD AUTO: 2.1 X10*3/UL (ref 1.2–4.8)
LYMPHOCYTES NFR BLD AUTO: 22.2 %
MCH RBC QN AUTO: 30.7 PG (ref 26–34)
MCHC RBC AUTO-ENTMCNC: 35.2 G/DL (ref 32–36)
MCV RBC AUTO: 87 FL (ref 80–100)
MONOCYTES # BLD AUTO: 0.52 X10*3/UL (ref 0.1–1)
MONOCYTES NFR BLD AUTO: 5.5 %
MUCOUS THREADS #/AREA URNS AUTO: NORMAL /LPF
NEUTROPHILS # BLD AUTO: 6.61 X10*3/UL (ref 1.2–7.7)
NEUTROPHILS NFR BLD AUTO: 69.8 %
NON HDL CHOLESTEROL: 86 MG/DL (ref 0–149)
NRBC BLD-RTO: 0 /100 WBCS (ref 0–0)
PLATELET # BLD AUTO: 238 X10*3/UL (ref 150–450)
POC HEMOGLOBIN A1C: 6.9 % (ref 4.2–6.5)
POTASSIUM SERPL-SCNC: 4.6 MMOL/L (ref 3.5–5.3)
PROT SERPL-MCNC: 7.4 G/DL (ref 6.4–8.2)
PSA SERPL-MCNC: 0.59 NG/ML
PSA SERPL-MCNC: 0.59 NG/ML
RBC # BLD AUTO: 4.89 X10*6/UL (ref 4.5–5.9)
RBC #/AREA URNS AUTO: NORMAL /HPF
SODIUM SERPL-SCNC: 139 MMOL/L (ref 136–145)
TRIGL SERPL-MCNC: 171 MG/DL (ref 0–149)
VLDL: 34 MG/DL (ref 0–40)
WBC # BLD AUTO: 9.5 X10*3/UL (ref 4.4–11.3)
WBC #/AREA URNS AUTO: NORMAL /HPF

## 2024-08-07 PROCEDURE — 84154 ASSAY OF PSA FREE: CPT

## 2024-08-07 PROCEDURE — 3008F BODY MASS INDEX DOCD: CPT | Performed by: FAMILY MEDICINE

## 2024-08-07 PROCEDURE — 81001 URINALYSIS AUTO W/SCOPE: CPT

## 2024-08-07 PROCEDURE — 84153 ASSAY OF PSA TOTAL: CPT

## 2024-08-07 PROCEDURE — 1036F TOBACCO NON-USER: CPT | Performed by: FAMILY MEDICINE

## 2024-08-07 PROCEDURE — 99213 OFFICE O/P EST LOW 20 MIN: CPT | Performed by: FAMILY MEDICINE

## 2024-08-07 PROCEDURE — 80053 COMPREHEN METABOLIC PANEL: CPT

## 2024-08-07 PROCEDURE — 3074F SYST BP LT 130 MM HG: CPT | Performed by: FAMILY MEDICINE

## 2024-08-07 PROCEDURE — 3079F DIAST BP 80-89 MM HG: CPT | Performed by: FAMILY MEDICINE

## 2024-08-07 PROCEDURE — 85025 COMPLETE CBC W/AUTO DIFF WBC: CPT

## 2024-08-07 PROCEDURE — 4010F ACE/ARB THERAPY RXD/TAKEN: CPT | Performed by: FAMILY MEDICINE

## 2024-08-07 PROCEDURE — 36415 COLL VENOUS BLD VENIPUNCTURE: CPT

## 2024-08-07 PROCEDURE — 80061 LIPID PANEL: CPT

## 2024-08-07 PROCEDURE — 83036 HEMOGLOBIN GLYCOSYLATED A1C: CPT

## 2024-08-07 PROCEDURE — 83036 HEMOGLOBIN GLYCOSYLATED A1C: CPT | Performed by: FAMILY MEDICINE

## 2024-08-07 RX ORDER — OMEPRAZOLE 20 MG/1
20 CAPSULE, DELAYED RELEASE ORAL DAILY
Qty: 90 CAPSULE | Refills: 1 | Status: SHIPPED | OUTPATIENT
Start: 2024-08-07

## 2024-08-07 RX ORDER — METFORMIN HYDROCHLORIDE 1000 MG/1
1000 TABLET ORAL
Qty: 180 TABLET | Refills: 1 | Status: SHIPPED | OUTPATIENT
Start: 2024-08-07

## 2024-08-07 RX ORDER — ATORVASTATIN CALCIUM 80 MG/1
80 TABLET, FILM COATED ORAL DAILY
Qty: 90 TABLET | Refills: 1 | Status: SHIPPED | OUTPATIENT
Start: 2024-08-07

## 2024-08-07 RX ORDER — LOSARTAN POTASSIUM 25 MG/1
25 TABLET ORAL DAILY
Qty: 90 TABLET | Refills: 1 | Status: SHIPPED | OUTPATIENT
Start: 2024-08-07

## 2024-08-07 RX ORDER — TRAZODONE HYDROCHLORIDE 50 MG/1
50 TABLET ORAL NIGHTLY
Qty: 90 TABLET | Refills: 1 | Status: SHIPPED | OUTPATIENT
Start: 2024-08-07

## 2024-08-07 RX ORDER — METOPROLOL TARTRATE 25 MG/1
25 TABLET, FILM COATED ORAL DAILY
Qty: 90 TABLET | Refills: 1 | Status: SHIPPED | OUTPATIENT
Start: 2024-08-07

## 2024-08-07 RX ORDER — GLIMEPIRIDE 2 MG/1
2 TABLET ORAL DAILY
Qty: 90 TABLET | Refills: 1 | Status: SHIPPED | OUTPATIENT
Start: 2024-08-07

## 2024-08-07 RX ORDER — BLOOD-GLUCOSE METER
EACH MISCELLANEOUS
Qty: 100 EACH | Refills: 0 | Status: SHIPPED | OUTPATIENT
Start: 2024-08-07

## 2024-08-07 ASSESSMENT — PATIENT HEALTH QUESTIONNAIRE - PHQ9
SUM OF ALL RESPONSES TO PHQ9 QUESTIONS 1 AND 2: 0
1. LITTLE INTEREST OR PLEASURE IN DOING THINGS: NOT AT ALL
1. LITTLE INTEREST OR PLEASURE IN DOING THINGS: NOT AT ALL
2. FEELING DOWN, DEPRESSED OR HOPELESS: NOT AT ALL
SUM OF ALL RESPONSES TO PHQ9 QUESTIONS 1 AND 2: 0
2. FEELING DOWN, DEPRESSED OR HOPELESS: NOT AT ALL

## 2024-08-08 ENCOUNTER — TELEPHONE (OUTPATIENT)
Dept: PRIMARY CARE | Facility: CLINIC | Age: 49
End: 2024-08-08
Payer: COMMERCIAL

## 2024-08-08 NOTE — TELEPHONE ENCOUNTER
----- Message from Carlos Carty sent at 8/7/2024  5:42 PM EDT -----  Labs normal.  Continue current medication recheck in 6 months

## 2024-08-08 NOTE — TELEPHONE ENCOUNTER
----- Message from Carlos Carty sent at 8/7/2024  5:41 PM EDT -----  Good glucose control recheck in 6 months

## 2024-08-09 LAB
PSA FREE MFR SERPL: 40 %
PSA FREE SERPL-MCNC: 0.2 NG/ML
PSA SERPL IA-MCNC: 0.5 NG/ML (ref 0–4)

## 2024-08-12 ENCOUNTER — TELEPHONE (OUTPATIENT)
Dept: PRIMARY CARE | Facility: CLINIC | Age: 49
End: 2024-08-12
Payer: COMMERCIAL

## 2024-08-12 NOTE — TELEPHONE ENCOUNTER
----- Message from Carlos Carty sent at 8/9/2024  5:11 PM EDT -----  Free and total PSA are normal

## 2025-01-07 ENCOUNTER — APPOINTMENT (OUTPATIENT)
Dept: PRIMARY CARE | Facility: CLINIC | Age: 50
End: 2025-01-07
Payer: COMMERCIAL

## 2025-01-07 VITALS
OXYGEN SATURATION: 99 % | TEMPERATURE: 97.1 F | BODY MASS INDEX: 34.36 KG/M2 | HEART RATE: 102 BPM | WEIGHT: 240 LBS | SYSTOLIC BLOOD PRESSURE: 150 MMHG | DIASTOLIC BLOOD PRESSURE: 84 MMHG | HEIGHT: 70 IN

## 2025-01-07 DIAGNOSIS — E11.69 TYPE 2 DIABETES MELLITUS WITH OTHER SPECIFIED COMPLICATION, UNSPECIFIED WHETHER LONG TERM INSULIN USE (MULTI): ICD-10-CM

## 2025-01-07 DIAGNOSIS — G89.29 CHRONIC PAIN OF LEFT KNEE: Primary | ICD-10-CM

## 2025-01-07 DIAGNOSIS — I10 HYPERTENSION, UNSPECIFIED TYPE: ICD-10-CM

## 2025-01-07 DIAGNOSIS — M25.562 CHRONIC PAIN OF LEFT KNEE: Primary | ICD-10-CM

## 2025-01-07 DIAGNOSIS — E78.5 HYPERLIPIDEMIA, UNSPECIFIED HYPERLIPIDEMIA TYPE: ICD-10-CM

## 2025-01-07 PROCEDURE — 3079F DIAST BP 80-89 MM HG: CPT | Performed by: FAMILY MEDICINE

## 2025-01-07 PROCEDURE — 1036F TOBACCO NON-USER: CPT | Performed by: FAMILY MEDICINE

## 2025-01-07 PROCEDURE — 3077F SYST BP >= 140 MM HG: CPT | Performed by: FAMILY MEDICINE

## 2025-01-07 PROCEDURE — 3008F BODY MASS INDEX DOCD: CPT | Performed by: FAMILY MEDICINE

## 2025-01-07 PROCEDURE — 99213 OFFICE O/P EST LOW 20 MIN: CPT | Performed by: FAMILY MEDICINE

## 2025-01-07 PROCEDURE — 4010F ACE/ARB THERAPY RXD/TAKEN: CPT | Performed by: FAMILY MEDICINE

## 2025-01-07 RX ORDER — LOSARTAN POTASSIUM 25 MG/1
25 TABLET ORAL DAILY
Qty: 90 TABLET | Refills: 1 | Status: SHIPPED | OUTPATIENT
Start: 2025-01-07

## 2025-01-07 RX ORDER — ATORVASTATIN CALCIUM 80 MG/1
80 TABLET, FILM COATED ORAL DAILY
Qty: 90 TABLET | Refills: 1 | Status: SHIPPED | OUTPATIENT
Start: 2025-01-07

## 2025-01-07 RX ORDER — TIRZEPATIDE 2.5 MG/.5ML
2.5 INJECTION, SOLUTION SUBCUTANEOUS WEEKLY
Qty: 2 ML | Refills: 1 | Status: SHIPPED | OUTPATIENT
Start: 2025-01-07

## 2025-01-07 RX ORDER — MELOXICAM 15 MG/1
15 TABLET ORAL DAILY
Qty: 30 TABLET | Refills: 3 | Status: SHIPPED | OUTPATIENT
Start: 2025-01-07 | End: 2026-01-07

## 2025-01-07 RX ORDER — METOPROLOL TARTRATE 25 MG/1
25 TABLET, FILM COATED ORAL DAILY
Qty: 90 TABLET | Refills: 1 | Status: SHIPPED | OUTPATIENT
Start: 2025-01-07

## 2025-01-07 ASSESSMENT — PATIENT HEALTH QUESTIONNAIRE - PHQ9
1. LITTLE INTEREST OR PLEASURE IN DOING THINGS: NOT AT ALL
SUM OF ALL RESPONSES TO PHQ9 QUESTIONS 1 AND 2: 0
2. FEELING DOWN, DEPRESSED OR HOPELESS: NOT AT ALL

## 2025-01-07 NOTE — PROGRESS NOTES
Subjective   Patient ID: Russell Mckeon is a 49 y.o. male who presents for No chief complaint on file..    HPI    Pt would like to discuss other options for diabetes.       Patient presents today for annual exam, HTN, DM, and HLD follow up.  He does not eat a low sodium, low sugar, low cholesterol diet. He does exercise sometimes. He does not check his BP at home. He does occasionally check his sugars at home. Last eye exam was a year ago. Last dental exam is going to be 1/13/2025. Last BW was 8/7/24.         Review of Systems  Constitutional:  no chills, no fever and no night sweats.  Eyes: no blurred vision and no eyesight problems.  ENT: no hearing loss, no nasal congestion, no hoarseness and no sore throat.  Neck: no mass (es) and no swelling.  Cardiovascular: no chest pain, no intermittent leg claudication, no lower extremity edema, no palpitation and no syncope.  Respiratory: no cough, no shortness of breath during exertion, no shortness of breath at rest and no wheezing.  Gastrointestinal: no abdominal pain, no blood in stools, no constipation, no diarrhea, no melena, no nausea, no rectal pain and no vomiting.  Genitourinary: no dysuria, no change in urinary frequency, no urinary hesitancy and no feelings of urinary urgency.  Musculoskeletal: no arthralgias, no back pain and no myalgias.  Integumentary: no new skin lesions and no rashes.  Neurological: no difficulty walking, no headache, no limb weakness, no numbness and no tingling.  Psychiatric/Behavioral: no anxiety, no depression, no anhedonia and no substance use disorders.  Endocrine: no recent weight gain and no recent weight loss.  Hematologic/Lymphatic: no tendency for easy bruising and no swollen glands    Objective   Physical Exam  Patient in for follow-up overall doing well blood sugars still slightly elevated.  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  Had a long discussion about this also would like to get his blood sugar under better control will try adding Mounjaro to see if that does not help suppress his appetite somewhat also.  Denies chest pain or shortness of breath with exertion.  Physical exam today's office visit constitutional alert and oriented x3.    Head is atraumatic HEENT is within normal limits.    Neck supple no masses full range of motion.    Thyroid is normal in size no thyromegaly there is no carotid bruits.    Pulmonary exam shows clear to auscultation no respiratory distress.    Cardiovascular shows no murmur rub or gallop.  Regular rate and rhythm.    Abdominal exam soft nontender no hepatosplenomegaly or masses normal bowel sounds no rebound no guarding.    Musculoskeletal exam no joint pain no muscle pain full range of motion.    Psych exam normal mood and affect.    Dermatologic exam no skin lesions no rash no blemishes.    Neuro exam is no focal deficits.  Normal exam.    Extremities no edema normal pulses normal capillary refill.  Medication and refill his other medications.  There were no vitals taken for this visit.    Lab Results   Component Value Date    WBC 9.5 08/07/2024    HGB 15.0 08/07/2024    HCT 42.6 08/07/2024    MCV 87 08/07/2024     08/07/2024       Assessment/Plan plan and Mounjaro recheck A1c in 3 months  Problem List Items Addressed This Visit    None

## 2025-02-04 ENCOUNTER — APPOINTMENT (OUTPATIENT)
Dept: PRIMARY CARE | Facility: CLINIC | Age: 50
End: 2025-02-04
Payer: COMMERCIAL

## 2025-02-04 VITALS
SYSTOLIC BLOOD PRESSURE: 116 MMHG | DIASTOLIC BLOOD PRESSURE: 84 MMHG | WEIGHT: 239 LBS | OXYGEN SATURATION: 98 % | TEMPERATURE: 97.9 F | HEIGHT: 70 IN | BODY MASS INDEX: 34.22 KG/M2 | HEART RATE: 88 BPM | RESPIRATION RATE: 18 BRPM

## 2025-02-04 DIAGNOSIS — E78.5 HYPERLIPIDEMIA, UNSPECIFIED HYPERLIPIDEMIA TYPE: ICD-10-CM

## 2025-02-04 DIAGNOSIS — E11.9 DIABETES MELLITUS TYPE 2 IN NONOBESE (MULTI): Primary | ICD-10-CM

## 2025-02-04 DIAGNOSIS — I10 HYPERTENSION, UNSPECIFIED TYPE: ICD-10-CM

## 2025-02-04 PROCEDURE — 3074F SYST BP LT 130 MM HG: CPT | Performed by: FAMILY MEDICINE

## 2025-02-04 PROCEDURE — 1036F TOBACCO NON-USER: CPT | Performed by: FAMILY MEDICINE

## 2025-02-04 PROCEDURE — 4010F ACE/ARB THERAPY RXD/TAKEN: CPT | Performed by: FAMILY MEDICINE

## 2025-02-04 PROCEDURE — 3079F DIAST BP 80-89 MM HG: CPT | Performed by: FAMILY MEDICINE

## 2025-02-04 PROCEDURE — 3008F BODY MASS INDEX DOCD: CPT | Performed by: FAMILY MEDICINE

## 2025-02-04 PROCEDURE — 99213 OFFICE O/P EST LOW 20 MIN: CPT | Performed by: FAMILY MEDICINE

## 2025-02-04 ASSESSMENT — PATIENT HEALTH QUESTIONNAIRE - PHQ9
SUM OF ALL RESPONSES TO PHQ9 QUESTIONS 1 AND 2: 0
1. LITTLE INTEREST OR PLEASURE IN DOING THINGS: NOT AT ALL
2. FEELING DOWN, DEPRESSED OR HOPELESS: NOT AT ALL

## 2025-02-04 NOTE — PROGRESS NOTES
Subjective   Patient ID: Russell Mckeon is a 49 y.o. male who presents for Follow-up, Diabetes, and Hypertension.  HPI  Patient presents today for a follow-up on Diabetes, Hypertension, and Hyperlipidemia. Does not follow a low sugar, low sodium, and low fat diet. does check sugar and/or BP at home. Exercises sometimes . Blood work 08/072024.       Review of Systems  Constitutional:  no chills, no fever and no night sweats.  Eyes: no blurred vision and no eyesight problems.  ENT: no hearing loss, no nasal congestion, no hoarseness and no sore throat.  Neck: no mass (es) and no swelling.  Cardiovascular: no chest pain, no intermittent leg claudication, no lower extremity edema, no palpitation and no syncope.  Respiratory: no cough, no shortness of breath during exertion, no shortness of breath at rest and no wheezing.  Gastrointestinal: no abdominal pain, no blood in stools, no constipation, no diarrhea, no melena, no nausea, no rectal pain and no vomiting.  Genitourinary: no dysuria, no change in urinary frequency, no urinary hesitancy and no feelings of urinary urgency.  Musculoskeletal: no arthralgias, no back pain and no myalgias.  Integumentary: no new skin lesions and no rashes.  Neurological: no difficulty walking, no headache, no limb weakness, no numbness and no tingling.  Psychiatric/Behavioral: no anxiety, no depression, no anhedonia and no substance use disorders.  Endocrine: no recent weight gain and no recent weight loss.  Hematologic/Lymphatic: no tendency for easy bruising and no swollen glands    Objective   Physical Exam  Patient in for follow-up hypertension hyperlipidemia diabetes does not check his sugars at home weight still elevated.  Blood pressure is controlled well-developed well-nourished mildly obese male in no acute distress lungs clear cardiac and abdominal exams are benign neurologic intact no focal deficits no evidence of peripheral neuropathy good distal pulses no edema.  There  were no vitals taken for this visit.    Lab Results   Component Value Date    WBC 9.5 08/07/2024    HGB 15.0 08/07/2024    HCT 42.6 08/07/2024    MCV 87 08/07/2024     08/07/2024       Assessment/Plan plan is to get blood drawn follow-up when we have the results if doing well routine recheck 6 months  Problem List Items Addressed This Visit       HTN (hypertension)    Hyperlipidemia

## 2025-02-07 ENCOUNTER — TELEPHONE (OUTPATIENT)
Dept: PRIMARY CARE | Facility: CLINIC | Age: 50
End: 2025-02-07
Payer: COMMERCIAL

## 2025-02-07 LAB
ALBUMIN SERPL-MCNC: 4.6 G/DL (ref 3.6–5.1)
ALP SERPL-CCNC: 72 U/L (ref 35–144)
ALT SERPL-CCNC: 21 U/L (ref 9–46)
ANION GAP SERPL CALCULATED.4IONS-SCNC: 9 MMOL/L (CALC) (ref 7–17)
AST SERPL-CCNC: 12 U/L (ref 10–35)
BASOPHILS # BLD AUTO: 58 CELLS/UL (ref 0–200)
BASOPHILS NFR BLD AUTO: 0.6 %
BILIRUB SERPL-MCNC: 0.9 MG/DL (ref 0.2–1.2)
BUN SERPL-MCNC: 10 MG/DL (ref 7–25)
CALCIUM SERPL-MCNC: 9.6 MG/DL (ref 8.6–10.3)
CHLORIDE SERPL-SCNC: 100 MMOL/L (ref 98–110)
CHOLEST SERPL-MCNC: 108 MG/DL
CHOLEST/HDLC SERPL: 2.5 (CALC)
CO2 SERPL-SCNC: 30 MMOL/L (ref 20–32)
CREAT SERPL-MCNC: 0.81 MG/DL (ref 0.7–1.3)
EGFRCR SERPLBLD CKD-EPI 2021: 107 ML/MIN/1.73M2
EOSINOPHIL # BLD AUTO: 116 CELLS/UL (ref 15–500)
EOSINOPHIL NFR BLD AUTO: 1.2 %
ERYTHROCYTE [DISTWIDTH] IN BLOOD BY AUTOMATED COUNT: 13.5 % (ref 11–15)
EST. AVERAGE GLUCOSE BLD GHB EST-MCNC: 160 MG/DL
EST. AVERAGE GLUCOSE BLD GHB EST-SCNC: 8.9 MMOL/L
GLUCOSE SERPL-MCNC: 146 MG/DL (ref 65–99)
HBA1C MFR BLD: 7.2 % OF TOTAL HGB
HCT VFR BLD AUTO: 45.2 % (ref 38.5–50)
HDLC SERPL-MCNC: 43 MG/DL
HGB BLD-MCNC: 15.8 G/DL (ref 13.2–17.1)
LDLC SERPL CALC-MCNC: 45 MG/DL (CALC)
LYMPHOCYTES # BLD AUTO: 2105 CELLS/UL (ref 850–3900)
LYMPHOCYTES NFR BLD AUTO: 21.7 %
MCH RBC QN AUTO: 31 PG (ref 27–33)
MCHC RBC AUTO-ENTMCNC: 35 G/DL (ref 32–36)
MCV RBC AUTO: 88.6 FL (ref 80–100)
MONOCYTES # BLD AUTO: 495 CELLS/UL (ref 200–950)
MONOCYTES NFR BLD AUTO: 5.1 %
NEUTROPHILS # BLD AUTO: 6926 CELLS/UL (ref 1500–7800)
NEUTROPHILS NFR BLD AUTO: 71.4 %
NONHDLC SERPL-MCNC: 65 MG/DL (CALC)
PLATELET # BLD AUTO: 224 THOUSAND/UL (ref 140–400)
PMV BLD REES-ECKER: 9.3 FL (ref 7.5–12.5)
POTASSIUM SERPL-SCNC: 4.8 MMOL/L (ref 3.5–5.3)
PROT SERPL-MCNC: 7.1 G/DL (ref 6.1–8.1)
RBC # BLD AUTO: 5.1 MILLION/UL (ref 4.2–5.8)
SODIUM SERPL-SCNC: 139 MMOL/L (ref 135–146)
TRIGL SERPL-MCNC: 116 MG/DL
WBC # BLD AUTO: 9.7 THOUSAND/UL (ref 3.8–10.8)

## 2025-02-07 NOTE — TELEPHONE ENCOUNTER
----- Message from Carlos Carty sent at 2/7/2025  7:43 AM EST -----  Labs normal except for slightly worsening A1c.  Watch diet try to lose some weight increase physical activity and repeated A1c in 6 months

## 2025-02-07 NOTE — TELEPHONE ENCOUNTER
-- Message from Carlos Carty sent at 2/7/2025  7:43 AM EST -----  Labs normal except for slightly worsening A1c.  Watch diet try to lose some weight increase physical activity and repeated A1c in 6 months       Patient called back and is aware.

## 2025-02-26 DIAGNOSIS — E11.69 TYPE 2 DIABETES MELLITUS WITH OTHER SPECIFIED COMPLICATION, UNSPECIFIED WHETHER LONG TERM INSULIN USE (MULTI): ICD-10-CM

## 2025-02-26 RX ORDER — TIRZEPATIDE 2.5 MG/.5ML
2.5 INJECTION, SOLUTION SUBCUTANEOUS WEEKLY
Qty: 2 ML | Refills: 1 | Status: SHIPPED | OUTPATIENT
Start: 2025-02-26

## 2025-03-04 ENCOUNTER — TELEPHONE (OUTPATIENT)
Dept: PRIMARY CARE | Facility: CLINIC | Age: 50
End: 2025-03-04
Payer: COMMERCIAL

## 2025-03-04 DIAGNOSIS — E13.9 DIABETES 1.5, MANAGED AS TYPE 2 (MULTI): Primary | ICD-10-CM

## 2025-03-04 DIAGNOSIS — E11.69 TYPE 2 DIABETES MELLITUS WITH OTHER SPECIFIED COMPLICATION, UNSPECIFIED WHETHER LONG TERM INSULIN USE (MULTI): ICD-10-CM

## 2025-03-04 RX ORDER — TIRZEPATIDE 5 MG/.5ML
5 INJECTION, SOLUTION SUBCUTANEOUS WEEKLY
Qty: 6 ML | Refills: 0 | Status: CANCELLED | OUTPATIENT
Start: 2025-03-04

## 2025-03-04 RX ORDER — TIRZEPATIDE 5 MG/.5ML
5 INJECTION, SOLUTION SUBCUTANEOUS WEEKLY
Qty: 2 ML | Refills: 1 | Status: SHIPPED | OUTPATIENT
Start: 2025-03-04

## 2025-03-04 NOTE — TELEPHONE ENCOUNTER
Carlos Carty MD  [id number] Clinical Support Staff24 minutes ago (2:32 PM)       Prescription was sent in.  Follow-up in a month to see if we need to go up to a higher dose

## 2025-03-04 NOTE — TELEPHONE ENCOUNTER
MEDICATION PENDED IF APPROVED    Patient is calling because he states he's due to get an increase in his Mounjaro. He's currently at the 2.5 mg dose and would like to increase it. He last saw you on 25.    Rx Refill Request Telephone Encounter    Name:  Russell Mckeon  : 1975     Medication Name:  Mounjaro  Dose (Optional):      Quantity (Optional):      Directions (Optional):       ALLERGIES:   nkda     Specific Pharmacy location:  [facility]    Date of last appointment:  25  Date of next appointment:  none     Best number to reach patient:  [phone]

## 2025-03-05 NOTE — TELEPHONE ENCOUNTER
Prescription was sent in.  Follow-up in a month to see if we need to go up to a higher dose       Patient called back and is aware.

## 2025-05-03 DIAGNOSIS — E13.9 DIABETES 1.5, MANAGED AS TYPE 2 (MULTI): ICD-10-CM

## 2025-05-05 RX ORDER — TIRZEPATIDE 5 MG/.5ML
5 INJECTION, SOLUTION SUBCUTANEOUS WEEKLY
Qty: 2 ML | Refills: 1 | Status: SHIPPED | OUTPATIENT
Start: 2025-05-05

## 2025-05-08 DIAGNOSIS — M25.562 CHRONIC PAIN OF LEFT KNEE: ICD-10-CM

## 2025-05-08 DIAGNOSIS — G89.29 CHRONIC PAIN OF LEFT KNEE: ICD-10-CM

## 2025-05-09 RX ORDER — MELOXICAM 15 MG/1
15 TABLET ORAL DAILY
Qty: 30 TABLET | Refills: 3 | Status: SHIPPED | OUTPATIENT
Start: 2025-05-09 | End: 2026-05-09

## 2025-06-17 DIAGNOSIS — G89.29 CHRONIC PAIN OF LEFT KNEE: ICD-10-CM

## 2025-06-17 DIAGNOSIS — K21.9 GASTROESOPHAGEAL REFLUX DISEASE, UNSPECIFIED WHETHER ESOPHAGITIS PRESENT: ICD-10-CM

## 2025-06-17 DIAGNOSIS — E13.9 DIABETES 1.5, MANAGED AS TYPE 2 (MULTI): ICD-10-CM

## 2025-06-17 DIAGNOSIS — E11.9 TYPE 2 DIABETES MELLITUS WITHOUT COMPLICATION, UNSPECIFIED WHETHER LONG TERM INSULIN USE: ICD-10-CM

## 2025-06-17 DIAGNOSIS — E78.5 HYPERLIPIDEMIA, UNSPECIFIED HYPERLIPIDEMIA TYPE: ICD-10-CM

## 2025-06-17 DIAGNOSIS — I10 HYPERTENSION, UNSPECIFIED TYPE: ICD-10-CM

## 2025-06-17 DIAGNOSIS — M25.562 CHRONIC PAIN OF LEFT KNEE: ICD-10-CM

## 2025-06-17 NOTE — TELEPHONE ENCOUNTER
Rx Refill Request Telephone Encounter    Name:  Russell Mckeon  : 1975     meloxicam (Mobic) 15 mg tablet [[id number]]    Order Details    Dose: 15 mg Route: oral Frequency: Daily   Dispense Quantity: 90 tablet (90 day supply) Refills: 1    Duration: 3 Dispense As Written: No    Note to Pharmacy: This prescription was filled on 2025. Any refills authorized will be placed on file.         Sig: Take 1 tablet (15 mg) by mouth once daily.           Mounjaro 5 mg/0.5 mL pen injector [[id number]]    Order Details  Dose: 5 mg Route: subcutaneous Frequency: Weekly   Dispense Quantity: 2 mL Refills: 3    Duration: -- Dispense As Written: No          Sig: Inject 5 mg under the skin 1 (one) time per week.     losartan (Cozaar) 25 mg tablet [[id number]]    Order Details  Dose: 25 mg Route: oral Frequency: Daily   Dispense Quantity: 90 tablet (90 day supply) Refills: 1    Duration: -- Dispense As Written: No          Sig: Take 1 tablet (25 mg) by mouth once daily.     metoprolol tartrate (Lopressor) 25 mg tablet [[id number]]    Order Details  Dose: 25 mg Route: oral Frequency: Daily   Dispense Quantity: 90 tablet (90 day supply) Refills: 1    Duration: -- Dispense As Written: No          Sig: Take 1 tablet (25 mg) by mouth once daily.     atorvastatin (Lipitor) 80 mg tablet [[id number]]    Order Details    Dose: 80 mg Route: oral Frequency: Daily   Dispense Quantity: 90 tablet (90 day supply) Refills: 1    Duration: -- Dispense As Written: No          Sig: Take 1 tablet (80 mg) by mouth once daily.           metFORMIN (Glucophage) 1,000 mg tablet [[id number]]    Order Details  Dose: 1,000 mg Route: oral Frequency: 2 times daily (morning and late afternoon)   Dispense Quantity: 180 tablet (90 day supply) Refills: 1    Duration: -- Dispense As Written: No          Sig: Take 1 tablet (1,000 mg) by mouth 2 times daily (morning and late afternoon).     omeprazole (PriLOSEC) 20 mg DR capsule [[id number]]    Order  Details  Dose: 20 mg Route: oral Frequency: Daily   Dispense Quantity: 90 capsule (90 day supply) Refills: 1    Duration: -- Dispense As Written: No          Sig: Take 1 capsule (20 mg) by mouth once daily.     ALLERGIES:   nkda    Specific Pharmacy location:  [facility]    Date of last appointment:  02/04/2025  Date of next appointment:  NONE    Best number to reach patient:  [phone]

## 2025-06-18 RX ORDER — METOPROLOL TARTRATE 25 MG/1
25 TABLET, FILM COATED ORAL DAILY
Qty: 90 TABLET | Refills: 1 | Status: SHIPPED | OUTPATIENT
Start: 2025-06-18

## 2025-06-18 RX ORDER — LOSARTAN POTASSIUM 25 MG/1
25 TABLET ORAL DAILY
Qty: 90 TABLET | Refills: 1 | Status: SHIPPED | OUTPATIENT
Start: 2025-06-18

## 2025-06-18 RX ORDER — OMEPRAZOLE 20 MG/1
20 CAPSULE, DELAYED RELEASE ORAL DAILY
Qty: 90 CAPSULE | Refills: 1 | Status: SHIPPED | OUTPATIENT
Start: 2025-06-18

## 2025-06-18 RX ORDER — METFORMIN HYDROCHLORIDE 1000 MG/1
1000 TABLET ORAL
Qty: 180 TABLET | Refills: 1 | Status: SHIPPED | OUTPATIENT
Start: 2025-06-18

## 2025-06-18 RX ORDER — TIRZEPATIDE 5 MG/.5ML
5 INJECTION, SOLUTION SUBCUTANEOUS WEEKLY
Qty: 2 ML | Refills: 1 | Status: SHIPPED | OUTPATIENT
Start: 2025-06-18

## 2025-06-18 RX ORDER — ATORVASTATIN CALCIUM 80 MG/1
80 TABLET, FILM COATED ORAL DAILY
Qty: 90 TABLET | Refills: 1 | Status: SHIPPED | OUTPATIENT
Start: 2025-06-18

## 2025-06-18 RX ORDER — MELOXICAM 15 MG/1
15 TABLET ORAL DAILY
Qty: 30 TABLET | Refills: 3 | Status: SHIPPED | OUTPATIENT
Start: 2025-06-18 | End: 2026-06-18

## 2025-07-31 DIAGNOSIS — E13.9 DIABETES 1.5, MANAGED AS TYPE 2 (MULTI): ICD-10-CM

## 2025-07-31 RX ORDER — TIRZEPATIDE 5 MG/.5ML
5 INJECTION, SOLUTION SUBCUTANEOUS WEEKLY
Qty: 2 ML | Refills: 1 | Status: SHIPPED | OUTPATIENT
Start: 2025-07-31

## 2025-07-31 NOTE — TELEPHONE ENCOUNTER
PATIENT IS REQUESTING A 90 DAY SUPPLY TO BE SENT TO PHARMACY.    Rx Refill Request Telephone Encounter    Name:  Russell Mckoen  : 1975     tirzepatide (Mounjaro) 5 mg/0.5 mL pen injector [[id number]]    Order Details  Dose: 5 mg Route: subcutaneous Frequency: Weekly   Dispense Quantity: 6 mL Refills: 1    Duration: -- Dispense As Written: No          Sig: Inject 5 mg under the skin 1 (one) time per week.       ALLERGIES:   nkda    Specific Pharmacy location:  [facility]     Date of last appointment:  2025  Date of next appointment:  NONE    Best number to reach patient:  [phone]